# Patient Record
Sex: MALE | Race: BLACK OR AFRICAN AMERICAN | NOT HISPANIC OR LATINO | Employment: UNEMPLOYED | URBAN - METROPOLITAN AREA
[De-identification: names, ages, dates, MRNs, and addresses within clinical notes are randomized per-mention and may not be internally consistent; named-entity substitution may affect disease eponyms.]

---

## 2018-01-12 ENCOUNTER — OFFICE VISIT (OUTPATIENT)
Dept: FAMILY MEDICINE | Facility: CLINIC | Age: 63
End: 2018-01-12
Payer: COMMERCIAL

## 2018-01-12 VITALS
DIASTOLIC BLOOD PRESSURE: 82 MMHG | RESPIRATION RATE: 16 BRPM | HEART RATE: 87 BPM | HEIGHT: 69 IN | SYSTOLIC BLOOD PRESSURE: 123 MMHG | BODY MASS INDEX: 28.73 KG/M2 | WEIGHT: 194 LBS | OXYGEN SATURATION: 97 % | TEMPERATURE: 98 F

## 2018-01-12 DIAGNOSIS — Z11.59 NEED FOR HEPATITIS C SCREENING TEST: ICD-10-CM

## 2018-01-12 DIAGNOSIS — A53.9 SYPHILIS: Primary | ICD-10-CM

## 2018-01-12 DIAGNOSIS — Z23 NEED FOR PROPHYLACTIC VACCINATION AND INOCULATION AGAINST INFLUENZA: ICD-10-CM

## 2018-01-12 DIAGNOSIS — Z23 NEED FOR PROPHYLACTIC VACCINATION WITH TETANUS-DIPHTHERIA (TD): ICD-10-CM

## 2018-01-12 DIAGNOSIS — Z12.11 SCREEN FOR COLON CANCER: ICD-10-CM

## 2018-01-12 DIAGNOSIS — A58: ICD-10-CM

## 2018-01-12 DIAGNOSIS — Z11.3 SCREEN FOR STD (SEXUALLY TRANSMITTED DISEASE): ICD-10-CM

## 2018-01-12 PROCEDURE — 86592 SYPHILIS TEST NON-TREP QUAL: CPT | Performed by: FAMILY MEDICINE

## 2018-01-12 PROCEDURE — 99202 OFFICE O/P NEW SF 15 MIN: CPT | Performed by: FAMILY MEDICINE

## 2018-01-12 PROCEDURE — 36415 COLL VENOUS BLD VENIPUNCTURE: CPT | Performed by: FAMILY MEDICINE

## 2018-01-12 PROCEDURE — 87591 N.GONORRHOEAE DNA AMP PROB: CPT | Performed by: FAMILY MEDICINE

## 2018-01-12 PROCEDURE — 86780 TREPONEMA PALLIDUM: CPT | Performed by: FAMILY MEDICINE

## 2018-01-12 PROCEDURE — 87491 CHLMYD TRACH DNA AMP PROBE: CPT | Performed by: FAMILY MEDICINE

## 2018-01-12 PROCEDURE — 86593 SYPHILIS TEST NON-TREP QUANT: CPT | Performed by: FAMILY MEDICINE

## 2018-01-12 PROCEDURE — 87389 HIV-1 AG W/HIV-1&-2 AB AG IA: CPT | Performed by: FAMILY MEDICINE

## 2018-01-12 RX ORDER — AZITHROMYCIN 500 MG/1
2000 TABLET, FILM COATED ORAL ONCE
Qty: 4 TABLET | Refills: 0 | Status: SHIPPED | OUTPATIENT
Start: 2018-01-12 | End: 2018-01-12

## 2018-01-12 NOTE — MR AVS SNAPSHOT
"              After Visit Summary   1/12/2018    John Oliveira    MRN: [id number]           Patient Information     Date Of Birth          1955        Visit Information        Provider Department      1/12/2018 12:40 PM Duncan Jenkins MD [facility] [place]        Today's Diagnoses     Granuloma inguinale in male    -  1    Screen for colon cancer        Need for hepatitis C screening test        Need for prophylactic vaccination and inoculation against influenza        Need for prophylactic vaccination with tetanus-diphtheria (TD)        Screen for STD (sexually transmitted disease)           Follow-ups after your visit        Follow-up notes from your care team     Return if symptoms worsen or fail to improve.      Who to contact     If you have questions or need follow up information about today's clinic visit or your schedule please contact [facility] [place] directly at [phone].  Normal or non-critical lab and imaging results will be communicated to you by MyChart, letter or phone within 4 business days after the clinic has received the results. If you do not hear from us within 7 days, please contact the clinic through MyChart or phone. If you have a critical or abnormal lab result, we will notify you by phone as soon as possible.  Submit refill requests through [employer] or call your pharmacy and they will forward the refill request to us. Please allow 3 business days for your refill to be completed.          Additional Information About Your Visit        MyChart Information     [employer] lets you send messages to your doctor, view your test results, renew your prescriptions, schedule appointments and more. To sign up, go to [email or web address] . Click on \"Log in\" on the left side of the screen, which will take you to the Welcome page. Then click on \"Sign up Now\" on the right side of the page.     You will be asked to enter the access code listed below, as well as some " "personal information. Please follow the directions to create your username and password.     Your access code is: [email or web address]  Expires: 2018  1:18 PM     Your access code will  in 90 days. If you need help or a new code, please call your [facility] clinic or [phone].        Care EveryWhere ID     This is your Care EveryWhere ID. This could be used by other organizations to access your [facility] medical records  [id number]        Your Vitals Were     Pulse Temperature Respirations Height Pulse Oximetry BMI (Body Mass Index)    87 98  F (36.7  C) (Tympanic) 16 5' 9\" (1.753 m) 97% 28.65 kg/m2       Blood Pressure from Last 3 Encounters:   18 123/82   13 136/90    Weight from Last 3 Encounters:   18 194 lb (88 kg)   13 200 lb (90.7 kg)              We Performed the Following     Anti Treponema     Chlamydia trachomatis PCR     HIV Antigen Antibody Combo     Neisseria gonorrhoeae PCR          Today's Medication Changes          These changes are accurate as of: 18  1:18 PM.  If you have any questions, ask your nurse or doctor.               Start taking these medicines.        Dose/Directions    azithromycin 500 MG tablet   Commonly known as:  ZITHROMAX   Used for:  Granuloma inguinale in male   Started by:  Duncan Jenkins MD        Dose:  2000 mg   Take 4 tablets (2,000 mg) by mouth once for 1 dose   Quantity:  4 tablet   Refills:  0            Where to get your medicines      These medications were sent to [facility] [place] [address] [place] [address]     Phone:  [phone]     azithromycin 500 MG tablet                Primary Care Provider Office Phone # Fax #    Blair Brownlee [phone]       [facility] [address] [place] [address]        Equal Access to Services     KVNG MARTINEZ AH: Hadii aad ku hadasho Soomaali, waaxda luqadaha, qaybta kaalmada " sven boudreauxnita kyreebryson metcalfaan ah. [facility] [phone].    ATENCIÓN: Si habla amy, tiene a salinas disposición servicios gratuitos de asistencia lingüística. Rizwana al [phone].    We comply with applicable federal civil rights laws and [place] laws. We do not discriminate on the basis of race, color, national origin, age, disability, sex, sexual orientation, or gender identity.            Thank you!     Thank you for choosing [facility]  for your care. Our goal is always to provide you with excellent care. Hearing back from our patients is one way we can continue to improve our services. Please take a few minutes to complete the written survey that you may receive in the mail after your visit with us. Thank you!             Your Updated Medication List - Protect others around you: Learn how to safely use, store and throw away your medicines at www.disposemymeds.org.          This list is accurate as of: 1/12/18  1:18 PM.  Always use your most recent med list.                   Brand Name Dispense Instructions for use Diagnosis    azithromycin 500 MG tablet    ZITHROMAX    4 tablet    Take 4 tablets (2,000 mg) by mouth once for 1 dose    Granuloma inguinale in male

## 2018-01-12 NOTE — NURSING NOTE
"Chief Complaint   Patient presents with     Lesion       Initial /82 (Cuff Size: Adult Large)  Pulse 87  Temp 98  F (36.7  C) (Tympanic)  Resp 16  Ht 5' 9\" (1.753 m)  Wt 194 lb (88 kg)  SpO2 97%  BMI 28.65 kg/m2 Estimated body mass index is 28.65 kg/(m^2) as calculated from the following:    Height as of this encounter: 5' 9\" (1.753 m).    Weight as of this encounter: 194 lb (88 kg).  Medication Reconciliation: complete   Tonia Nunes, CMA  "

## 2018-01-12 NOTE — PROGRESS NOTES
"  SUBJECTIVE:   John Oliveira is a 62 year old male who presents to clinic today for the following health issues:      Lesion       Duration: 10 days     Description (location/character/radiation): lesion on penis     Intensity:  moderate    Accompanying signs and symptoms: none     History (similar episodes/previous evaluation): None    Precipitating or alleviating factors: None    Therapies tried and outcome: Alcohol      Problem list and histories reviewed & adjusted, as indicated.  Additional history: as documented    There is no problem list on file for this patient.    Past Surgical History:   Procedure Laterality Date     ORTHOPEDIC SURGERY         Social History   Substance Use Topics     Smoking status: Former Smoker     Quit date: 1/1/2003     Smokeless tobacco: Never Used     Alcohol use Yes     No family history on file.      Current Outpatient Prescriptions   Medication Sig Dispense Refill     azithromycin (ZITHROMAX) 500 MG tablet Take 4 tablets (2,000 mg) by mouth once for 1 dose 4 tablet 0     Allergies   Allergen Reactions     Codeine Sulfate      Penicillins      Recent Labs   Lab Test  03/25/13   0000   CR  1.08   GFRESTIMATED  71   GFRESTBLACK  86   POTASSIUM  3.7      BP Readings from Last 3 Encounters:   01/12/18 123/82   03/24/13 136/90    Wt Readings from Last 3 Encounters:   01/12/18 194 lb (88 kg)   03/24/13 200 lb (90.7 kg)             Reviewed and updated as needed this visit by clinical staff     Reviewed and updated as needed this visit by Provider         ROS:  Constitutional, HEENT, cardiovascular, pulmonary, gi and gu systems are negative, except as otherwise noted.      OBJECTIVE:   /82 (Cuff Size: Adult Large)  Pulse 87  Temp 98  F (36.7  C) (Tympanic)  Resp 16  Ht 5' 9\" (1.753 m)  Wt 194 lb (88 kg)  SpO2 97%  BMI 28.65 kg/m2  Body mass index is 28.65 kg/(m^2).  GENERAL: healthy, alert and no distress  NECK: no adenopathy, no asymmetry, masses, or scars and thyroid " normal to palpation  RESP: lungs clear to auscultation - no rales, rhonchi or wheezes  CV: regular rate and rhythm, normal S1 S2, no S3 or S4, no murmur, click or rub, no peripheral edema and peripheral pulses strong  ABDOMEN: soft, nontender, no hepatosplenomegaly, no masses and bowel sounds normal   (male): testicles normal without atrophy or masses, no hernias and ulcerative lesion on glans penis, not tender, swollen inguinal L/N  MS: no gross musculoskeletal defects noted, no edema        ASSESSMENT/PLAN:   John was seen today for lesion.    Diagnoses and all orders for this visit:    Granuloma inguinale in male  -     azithromycin (ZITHROMAX) 500 MG tablet; Take 4 tablets (2,000 mg) by mouth once for 1 dose    Screen for colon cancer    Need for hepatitis C screening test    Need for prophylactic vaccination and inoculation against influenza    Need for prophylactic vaccination with tetanus-diphtheria (TD)    Screen for STD (sexually transmitted disease)  -     HIV Antigen Antibody Combo  -     Anti Treponema  -     Neisseria gonorrhoeae PCR  -     Chlamydia trachomatis PCR    Other orders  -     Cancel: Hepatitis C Screen Reflex to HCV RNA Quant and Genotype  -     Cancel: Lipid panel reflex to direct LDL Fasting  -     Cancel: GASTROENTEROLOGY ADULT REF PROCEDURE ONLY      Will review other STD results and update pt     Duncan Jenkins MD  [facility] [place]

## 2018-01-13 LAB
RPR SER QL: REACTIVE
RPR SER-TITR: 8 {TITER}
T PALLIDUM IGG+IGM SER QL: POSITIVE

## 2018-01-14 LAB
C TRACH DNA SPEC QL NAA+PROBE: NEGATIVE
N GONORRHOEA DNA SPEC QL NAA+PROBE: NEGATIVE
SPECIMEN SOURCE: NORMAL
SPECIMEN SOURCE: NORMAL

## 2018-01-14 RX ORDER — DOXYCYCLINE 100 MG/1
100 TABLET ORAL 2 TIMES DAILY
Qty: 28 TABLET | Refills: 0 | Status: CANCELLED | OUTPATIENT
Start: 2018-01-14 | End: 2018-01-28

## 2018-01-15 LAB — HIV 1+2 AB+HIV1 P24 AG SERPL QL IA: NONREACTIVE

## 2018-01-16 ENCOUNTER — ALLIED HEALTH/NURSE VISIT (OUTPATIENT)
Dept: NURSING | Facility: CLINIC | Age: 63
End: 2018-01-16
Payer: COMMERCIAL

## 2018-01-16 DIAGNOSIS — A53.9 SYPHILIS: Primary | ICD-10-CM

## 2018-01-16 DIAGNOSIS — Z53.9 ERRONEOUS ENCOUNTER--DISREGARD: Primary | ICD-10-CM

## 2018-01-16 RX ORDER — DOXYCYCLINE 100 MG/1
100 TABLET ORAL 2 TIMES DAILY
Qty: 28 TABLET | Refills: 0 | Status: SHIPPED | OUTPATIENT
Start: 2018-01-16 | End: 2018-01-30

## 2018-01-16 NOTE — PROGRESS NOTES
Pt presents for a nurse only bicillin injection. Pt had questions/concerns and after speaking to Dr Jenkins treatment plan was changed.  No injection given.Miquel JACOBSON CMA

## 2018-01-16 NOTE — MR AVS SNAPSHOT
"              After Visit Summary   2018    John Oliveira    MRN: [id number]           Patient Information     Date Of Birth          1955        Visit Information        Provider Department      2018 3:15 PM ODETTE MOLINA/LPN [facility] [place]        Today's Diagnoses     ERRONEOUS ENCOUNTER--DISREGARD    -  1       Follow-ups after your visit        Who to contact     If you have questions or need follow up information about today's clinic visit or your schedule please contact [facility] [place] directly at [phone].  Normal or non-critical lab and imaging results will be communicated to you by [employer]iC[employer], letter or phone within 4 business days after the clinic has received the results. If you do not hear from us within 7 days, please contact the clinic through [employer] or phone. If you have a critical or abnormal lab result, we will notify you by phone as soon as possible.  Submit refill requests through [employer] or call your pharmacy and they will forward the refill request to us. Please allow 3 business days for your refill to be completed.          Additional Information About Your Visit        MyChart Information     [employer] lets you send messages to your doctor, view your test results, renew your prescriptions, schedule appointments and more. To sign up, go to [email or web address] . Click on \"Log in\" on the left side of the screen, which will take you to the Welcome page. Then click on \"Sign up Now\" on the right side of the page.     You will be asked to enter the access code listed below, as well as some personal information. Please follow the directions to create your username and password.     Your access code is: [email or web address]  Expires: 2018  1:18 PM     Your access code will  in 90 days. If you need help or a new code, please call your [facility] or [phone].        Care EveryWhere ID     This is your Care EveryWhere ID. This could be used by other " organizations to access your [facility] medical records  [id number]         Blood Pressure from Last 3 Encounters:   01/12/18 123/82   03/24/13 136/90    Weight from Last 3 Encounters:   01/12/18 194 lb (88 kg)   03/24/13 200 lb (90.7 kg)              Today, you had the following     No orders found for display         Today's Medication Changes          These changes are accurate as of: 1/16/18  3:47 PM.  If you have any questions, ask your nurse or doctor.               Start taking these medicines.        Dose/Directions    doxycycline Monohydrate 100 MG Tabs   Used for:  Syphilis   Started by:  Duncan Jenkins MD        Dose:  100 mg   Take 100 mg by mouth 2 times daily for 14 days   Quantity:  28 tablet   Refills:  0            Where to get your medicines      These medications were sent to [facility] [place] - [address]  [address], [place] [address]     Phone:  [phone]     doxycycline Monohydrate 100 MG Tabs                Primary Care Provider Office Phone # Fax #    Blair JACOBSON Kem [phone]       [facility] [address] [place] [address]        Equal Access to Services     KVNG MARTINEZ AH: Hadii aad ku hadasho Soomaali, waaxda luqadaha, qaybta kaalmada adeegyada, waxay idiin hayaan yung london. So [facility] [phone].    ATENCIÓN: Si habla español, tiene a salinas disposición servicios gratuitos de asistencia lingüística. Llame al [phone].    We comply with applicable federal civil rights laws and [place] laws. We do not discriminate on the basis of race, color, national origin, age, disability, sex, sexual orientation, or gender identity.            Thank you!     Thank you for choosing [facility] [place]  for your care. Our goal is always to provide you with excellent care. Hearing back from our patients is one way we can continue to improve our services. Please take a few  minutes to complete the written survey that you may receive in the mail after your visit with us. Thank you!             Your Updated Medication List - Protect others around you: Learn how to safely use, store and throw away your medicines at www.disposemymeds.org.          This list is accurate as of: 1/16/18  3:47 PM.  Always use your most recent med list.                   Brand Name Dispense Instructions for use Diagnosis    doxycycline Monohydrate 100 MG Tabs     28 tablet    Take 100 mg by mouth 2 times daily for 14 days    Syphilis

## 2018-11-26 ENCOUNTER — OFFICE VISIT (OUTPATIENT)
Dept: FAMILY MEDICINE | Facility: CLINIC | Age: 63
End: 2018-11-26
Payer: COMMERCIAL

## 2018-11-26 ENCOUNTER — ALLIED HEALTH/NURSE VISIT (OUTPATIENT)
Dept: NURSING | Facility: CLINIC | Age: 63
End: 2018-11-26
Payer: COMMERCIAL

## 2018-11-26 VITALS — SYSTOLIC BLOOD PRESSURE: 112 MMHG | HEART RATE: 72 BPM | DIASTOLIC BLOOD PRESSURE: 72 MMHG

## 2018-11-26 VITALS — HEART RATE: 76 BPM | SYSTOLIC BLOOD PRESSURE: 112 MMHG | DIASTOLIC BLOOD PRESSURE: 72 MMHG

## 2018-11-26 DIAGNOSIS — F43.0 ACUTE REACTION TO STRESS: Primary | ICD-10-CM

## 2018-11-26 DIAGNOSIS — G44.219 EPISODIC TENSION-TYPE HEADACHE, NOT INTRACTABLE: ICD-10-CM

## 2018-11-26 DIAGNOSIS — G44.209 TENSION HEADACHE: Primary | ICD-10-CM

## 2018-11-26 PROCEDURE — 99214 OFFICE O/P EST MOD 30 MIN: CPT | Performed by: FAMILY MEDICINE

## 2018-11-26 PROCEDURE — 99207 ZZC NO CHARGE NURSE ONLY: CPT

## 2018-11-26 NOTE — PROGRESS NOTES
States that the last 2 days having funny sensation coming though his head.  States has had increased stress last 3 days but having headache over 24 hours taking Acetaminophen and that does not help which is not usual for patient.  States that has some lightheadedness last couple days come and go.  State that when he stops it passes quickly.  Feels these may be caused by stress.    Denies: vision changes, ringing in ears, chest pain, palpitations, SOB      Historical BP  BP Readings from Last 3 Encounters:   01/12/18 123/82   03/24/13 136/90       Patient placed on providers schedule for further evaluation  Anne Beaulieu RN - Triage  [facility]

## 2018-11-26 NOTE — MR AVS SNAPSHOT
"              After Visit Summary   11/26/2018    John Oliveira    MRN: [id number]           Patient Information     Date Of Birth          1955        Visit Information        Provider Department      11/26/2018 11:00 AM EC RN [facility]        Today's Diagnoses     Tension headache    -  1       Follow-ups after your visit        Your next 10 appointments already scheduled     Nov 26, 2018 11:20 AM CST   Office Visit with Duncan Jenkins MD   [facility] [place]    [address] [place] [id number] [phone]           Bring a current list of meds and any records pertaining to this visit. For Physicals, please bring immunization records and any forms needing to be filled out. Please arrive 10 minutes early to complete paperwork.              Who to contact     If you have questions or need follow up information about today's clinic visit or your schedule please contact [facility] directly at [phone].  Normal or non-critical lab and imaging results will be communicated to you by BG Medicinehart, letter or phone within 4 business days after the clinic has received the results. If you do not hear from us within 7 days, please contact the clinic through WhoJamt or phone. If you have a critical or abnormal lab result, we will notify you by phone as soon as possible.  Submit refill requests through [employer] or call your pharmacy and they will forward the refill request to us. Please allow 3 business days for your refill to be completed.          Additional Information About Your Visit        BG [employer]harCell[employer] Information     [employer] lets you send messages to your doctor, view your test results, renew your prescriptions, schedule appointments and more. To sign up, go to [email or web address] . Click on \"Log in\" on the left side of the screen, which will take you to the Welcome page. Then click on \"Sign up Now\" on the right " side of the page.     You will be asked to enter the access code listed below, as well as some personal information. Please follow the directions to create your username and password.     Your access code is: [email or web address]  Expires: 2019 11:05 AM     Your access code will  in 90 days. If you need help or a new code, please call your [place] clinic or [phone].        Care EveryWhere ID     This is your Care EveryWhere ID. This could be used by other organizations to access your [place] [facility] medical records  [id number]        Your Vitals Were     Pulse                   76            Blood Pressure from Last 3 Encounters:   18 112/72   18 123/82   13 136/90    Weight from Last 3 Encounters:   18 194 lb (88 kg)   13 200 lb (90.7 kg)              Today, you had the following     No orders found for display       Primary Care Provider Office Phone # Fax #    Blair JACOBSON Kem [phone]       [facility] [address] [place] [address]        Equal Access to Services     [facility]: Hadii aad ku hadasho Soomaali, waaxda luqadaha, qaybta kaalmada adeegyada, sven riverain hayjannien yung kimball . So [facility] [phone].    ATENCIÓN: Si habla español, tiene a salinas disposición servicios gratuitos de asistencia lingüística. Llame al [phone].    We comply with applicable federal civil rights laws and [place] laws. We do not discriminate on the basis of race, color, national origin, age, disability, sex, sexual orientation, or gender identity.            Thank you!     Thank you for choosing [facility] [place]  for your care. Our goal is always to provide you with excellent care. Hearing back from our patients is one way we can continue to improve our services. Please take a few minutes to complete the written survey that you may receive in the mail after your visit with us. Thank you!             Your Updated Medication List -  Protect others around you: Learn how to safely use, store and throw away your medicines at www.disposemymeds.org.          This list is accurate as of 11/26/18 11:05 AM.  Always use your most recent med list.                   Brand Name Dispense Instructions for use Diagnosis    TYLENOL PO      Take 325 mg by mouth as needed for mild pain or fever

## 2018-11-26 NOTE — PROGRESS NOTES
SUBJECTIVE:   John Oliveira is a 63 year old male who presents to clinic today for the following health issues:      States that the last 2 days having funny sensation coming though his head.  States has had increased stress last 3 days but having headache over 24 hours taking Acetaminophen and that does not help which is not usual for patient.  States that has some lightheadedness last couple days come and go.  State that when he stops it passes quickly. Denies it coming on with specific position changes.  Feels these may be caused by stress.    Denies: vision changes, ringing in ears, chest pain, palpitations, SOB      Problem list and histories reviewed & adjusted, as indicated.  Additional history: as documented    There is no problem list on file for this patient.    Past Surgical History:   Procedure Laterality Date     ORTHOPEDIC SURGERY         Social History   Substance Use Topics     Smoking status: Former Smoker     Quit date: 1/1/2003     Smokeless tobacco: Never Used     Alcohol use Yes     No family history on file.      Current Outpatient Prescriptions   Medication Sig Dispense Refill     Acetaminophen (TYLENOL PO) Take 325 mg by mouth as needed for mild pain or fever       Allergies   Allergen Reactions     Codeine Sulfate      Penicillins      Recent Labs   Lab Test  03/25/13   0000   CR  1.08   GFRESTIMATED  71   GFRESTBLACK  86   POTASSIUM  3.7      BP Readings from Last 3 Encounters:   11/26/18 112/72   11/26/18 112/72   01/12/18 123/82    Wt Readings from Last 3 Encounters:   01/12/18 194 lb (88 kg)   03/24/13 200 lb (90.7 kg)                    Reviewed and updated as needed this visit by clinical staff  Allergies  Meds       Reviewed and updated as needed this visit by Provider         ROS:  Constitutional, HEENT, cardiovascular, pulmonary, gi and gu systems are negative, except as otherwise noted.    OBJECTIVE:     /72 (BP Location: Left arm, Patient Position: Chair, Cuff Size:  Adult Regular)  Pulse 72  There is no height or weight on file to calculate BMI.  GENERAL: healthy, alert and no distress  NECK: no adenopathy, no asymmetry, masses, or scars and thyroid normal to palpation  RESP: lungs clear to auscultation - no rales, rhonchi or wheezes  CV: regular rate and rhythm, normal S1 S2, no S3 or S4, no murmur, click or rub, no peripheral edema and peripheral pulses strong  ABDOMEN: soft, nontender, no hepatosplenomegaly, no masses and bowel sounds normal  MS: no gross musculoskeletal defects noted, no edema  Neurologic: normal       ASSESSMENT/PLAN:   John was seen today for headache.    Diagnoses and all orders for this visit:    Acute reaction to stress    Episodic tension-type headache, not intractable      Related with increased level of stress in the family  Is normal hemodynamically, has no neurologic sx  Encouraged him to relaxation and stress management,   Offered to take migraine medicine, pt declined   Will have him to monitor closely, and if not improving, will consider referral CT head for further evaluation     FUTURE APPOINTMENTS:       - Follow-up visit in 2-3 months for CPE and fasting lab     Duncan Jenkins MD  [facility] [place] [facility]

## 2018-11-26 NOTE — MR AVS SNAPSHOT
"              After Visit Summary   2018    John Oliveira    MRN: [id number]           Patient Information     Date Of Birth          1955        Visit Information        Provider Department      2018 11:20 AM Duncan Jenkins MD [facility] [place]        Today's Diagnoses     Acute reaction to stress    -  1    Episodic tension-type headache, not intractable           Follow-ups after your visit        Follow-up notes from your care team     Return in about 3 months (around 2019) for Physical Exam.      Who to contact     If you have questions or need follow up information about today's clinic visit or your schedule please contact [facility] [place] directly at [phone].  Normal or non-critical lab and imaging results will be communicated to you by MyChart, letter or phone within 4 business days after the clinic has received the results. If you do not hear from us within 7 days, please contact the clinic through MyChart or phone. If you have a critical or abnormal lab result, we will notify you by phone as soon as possible.  Submit refill requests through [employer]merge or call your pharmacy and they will forward the refill request to us. Please allow 3 business days for your refill to be completed.          Additional Information About Your Visit        MyChart Information     [employer]merge lets you send messages to your doctor, view your test results, renew your prescriptions, schedule appointments and more. To sign up, go to [email or web address] . Click on \"Log in\" on the left side of the screen, which will take you to the Welcome page. Then click on \"Sign up Now\" on the right side of the page.     You will be asked to enter the access code listed below, as well as some personal information. Please follow the directions to create your username and password.     Your access code is: [email or web address]  Expires: 2019 11:05 AM     Your access code will  in 90 days. If you " need help or a new code, please call your [place] clinic or [phone].        Care EveryWhere ID     This is your Care EveryWhere ID. This could be used by other organizations to access your [place] medical records  [id number]        Your Vitals Were     Pulse                   72            Blood Pressure from Last 3 Encounters:   11/26/18 112/72   11/26/18 112/72   01/12/18 123/82    Weight from Last 3 Encounters:   01/12/18 194 lb (88 kg)   03/24/13 200 lb (90.7 kg)              Today, you had the following     No orders found for display       Primary Care Provider Office Phone # Fax #    Blair Brownlee [phone]       [facility] [address] [place] [address]        Equal Access to Services     [facility]: Hadii aad ku hadasho Soomaali, waaxda luqadaha, qaybta kaalmada adeegyada, waxay nicolein hayaan yung kimball . So [facility] [phone].    ATENCIÓN: Si habla español, tiene a salinas disposición servicios gratuitos de asistencia lingüística. Riz[place]ana al [phone].    We comply with applicable federal civil rights laws and [place] laws. We do not discriminate on the basis of race, color, national origin, age, disability, sex, sexual orientation, or gender identity.            Thank you!     Thank you for choosing [facility] [place]  for your care. Our goal is always to provide you with excellent care. Hearing back from our patients is one way we can continue to improve our services. Please take a few minutes to complete the written survey that you may receive in the mail after your visit with us. Thank you!             Your Updated Medication List - Protect others around you: Learn how to safely use, store and throw away your medicines at www.disposemymeds.org.          This list is accurate as of 11/26/18 11:32 AM.  Always use your most recent med list.                   Brand Name Dispense Instructions for use Diagnosis    TYLENOL PO      Take  325 mg by mouth as needed for mild pain or fever

## 2019-01-31 ENCOUNTER — OFFICE VISIT (OUTPATIENT)
Dept: FAMILY MEDICINE | Facility: CLINIC | Age: 64
End: 2019-01-31

## 2019-01-31 VITALS
RESPIRATION RATE: 14 BRPM | OXYGEN SATURATION: 99 % | BODY MASS INDEX: 28.88 KG/M2 | TEMPERATURE: 97.7 F | WEIGHT: 195 LBS | SYSTOLIC BLOOD PRESSURE: 102 MMHG | HEIGHT: 69 IN | HEART RATE: 82 BPM | DIASTOLIC BLOOD PRESSURE: 70 MMHG

## 2019-01-31 DIAGNOSIS — L98.9 SKIN LESION: Primary | ICD-10-CM

## 2019-01-31 DIAGNOSIS — R21 SKIN RASH: ICD-10-CM

## 2019-01-31 DIAGNOSIS — Z12.11 SCREEN FOR COLON CANCER: ICD-10-CM

## 2019-01-31 DIAGNOSIS — Z86.19 HISTORY OF SYPHILIS: ICD-10-CM

## 2019-01-31 PROCEDURE — 86592 SYPHILIS TEST NON-TREP QUAL: CPT | Performed by: FAMILY MEDICINE

## 2019-01-31 PROCEDURE — 99214 OFFICE O/P EST MOD 30 MIN: CPT | Performed by: FAMILY MEDICINE

## 2019-01-31 PROCEDURE — 86780 TREPONEMA PALLIDUM: CPT | Mod: 59 | Performed by: FAMILY MEDICINE

## 2019-01-31 PROCEDURE — 99000 SPECIMEN HANDLING OFFICE-LAB: CPT | Performed by: FAMILY MEDICINE

## 2019-01-31 PROCEDURE — 86780 TREPONEMA PALLIDUM: CPT | Mod: 90 | Performed by: FAMILY MEDICINE

## 2019-01-31 PROCEDURE — 36415 COLL VENOUS BLD VENIPUNCTURE: CPT | Performed by: FAMILY MEDICINE

## 2019-01-31 ASSESSMENT — MIFFLIN-ST. JEOR: SCORE: 1679.89

## 2019-01-31 NOTE — PROGRESS NOTES
"  SUBJECTIVE:   John Oliveira is a 63 year old male who presents to clinic today for the following health issues:      Rash      Duration: 2-3 months     Description  Location: scalp on and off   Itching: mild    Intensity:  moderate    Accompanying signs and symptoms: None    History (similar episodes/previous evaluation): None    Precipitating or alleviating factors:  New exposures:  None  Recent travel: no      Therapies tried and outcome: none      Problem list and histories reviewed & adjusted, as indicated.  Additional history: as documented    There is no problem list on file for this patient.    Past Surgical History:   Procedure Laterality Date     ORTHOPEDIC SURGERY         Social History     Tobacco Use     Smoking status: Former Smoker     Last attempt to quit: 2003     Years since quittin.0     Smokeless tobacco: Never Used   Substance Use Topics     Alcohol use: Yes     No family history on file.      No current outpatient medications on file.     Allergies   Allergen Reactions     Codeine Sulfate      Penicillins      Recent Labs   Lab Test 13  0000   CR 1.08   GFRESTIMATED 71   GFRESTBLACK 86   POTASSIUM 3.7      BP Readings from Last 3 Encounters:   19 102/70   18 112/72   18 112/72    Wt Readings from Last 3 Encounters:   19 88.5 kg (195 lb)   18 88 kg (194 lb)   13 90.7 kg (200 lb)                    Reviewed and updated as needed this visit by clinical staff       Reviewed and updated as needed this visit by Provider         ROS:  Constitutional, HEENT, cardiovascular, pulmonary, gi and gu systems are negative, except as otherwise noted.    OBJECTIVE:     /70 (Cuff Size: Adult Large)   Pulse 82   Temp 97.7  F (36.5  C) (Tympanic)   Resp 14   Ht 1.753 m (5' 9\")   Wt 88.5 kg (195 lb)   SpO2 99%   BMI 28.80 kg/m    Body mass index is 28.8 kg/m .  GENERAL: healthy, alert and no distress  EYES: Eyes grossly normal to inspection, PERRL " and conjunctivae and sclerae normal  HENT: ear canals and TM's normal, nose and mouth without ulcers or lesions  NECK: no adenopathy, no asymmetry, masses, or scars and thyroid normal to palpation  RESP: lungs clear to auscultation - no rales, rhonchi or wheezes  CV: regular rate and rhythm, normal S1 S2, no S3 or S4, no murmur, click or rub, no peripheral edema and peripheral pulses strong  ABDOMEN: soft, nontender, no hepatosplenomegaly, no masses and bowel sounds normal  MS: no gross musculoskeletal defects noted, no edema  SKIN: no suspicious lesions or rashes  NEURO: Normal strength and tone, mentation intact and speech normal  BACK: no CVA tenderness, no paralumbar tenderness  PSYCH: mentation appears normal, affect normal/bright  LYMPH: no cervical, supraclavicular, axillary, or inguinal adenopathy      ASSESSMENT/PLAN:   John was seen today for derm problem.    Diagnoses and all orders for this visit:    Skin lesion  -     SKIN CARE REFERRAL  -     Treponema Abs w Reflex to RPR and Titer    Screen for colon cancer  -     GASTROENTEROLOGY ADULT REF PROCEDURE ONLY Jose Looney [phone]; No Provider Preference    Skin rash  -     Treponema Abs w Reflex to RPR and Titer    History of syphilis  -     Treponema Abs w Reflex to RPR and Titer    Other orders  -     Cancel: Lipid panel reflex to direct LDL Fasting      Rash with itching on scalp, has elevated edge with geographic shape  He had past h/o syphilis infection, has no other lesion on hands   Will have him to recheck RPR, he wanted to get referred specialist, will have him to see skin care       FUTURE APPOINTMENTS:       - Follow-up visit in 6 months for CPE    Duncan Jenkins MD  [facility] [place]

## 2019-02-02 LAB
RPR SER QL: NONREACTIVE
T PALLIDUM AB SER QL: REACTIVE

## 2019-02-04 ENCOUNTER — TELEPHONE (OUTPATIENT)
Dept: FAMILY MEDICINE | Facility: CLINIC | Age: 64
End: 2019-02-04

## 2019-02-04 DIAGNOSIS — A53.0 POSITIVE RPR TEST: Primary | ICD-10-CM

## 2019-02-04 LAB
RPR SER QL: NONREACTIVE
T PALLIDUM AB SER QL AGGL: REACTIVE

## 2019-02-14 ENCOUNTER — OFFICE VISIT (OUTPATIENT)
Dept: FAMILY MEDICINE | Facility: CLINIC | Age: 64
End: 2019-02-14

## 2019-02-14 VITALS — DIASTOLIC BLOOD PRESSURE: 82 MMHG | SYSTOLIC BLOOD PRESSURE: 124 MMHG

## 2019-02-14 DIAGNOSIS — A53.0 POSITIVE RPR TEST: ICD-10-CM

## 2019-02-14 DIAGNOSIS — D48.5 NEOPLASM OF UNCERTAIN BEHAVIOR OF SKIN: Primary | ICD-10-CM

## 2019-02-14 DIAGNOSIS — T14.8XXA EXCORIATION: ICD-10-CM

## 2019-02-14 PROCEDURE — 86780 TREPONEMA PALLIDUM: CPT | Mod: 90 | Performed by: FAMILY MEDICINE

## 2019-02-14 PROCEDURE — 36415 COLL VENOUS BLD VENIPUNCTURE: CPT | Performed by: FAMILY MEDICINE

## 2019-02-14 PROCEDURE — 99213 OFFICE O/P EST LOW 20 MIN: CPT | Mod: 25 | Performed by: FAMILY MEDICINE

## 2019-02-14 PROCEDURE — 0065U SYFLS TST NONTREPONEMAL ANTB: CPT | Performed by: FAMILY MEDICINE

## 2019-02-14 PROCEDURE — 11104 PUNCH BX SKIN SINGLE LESION: CPT | Performed by: FAMILY MEDICINE

## 2019-02-14 PROCEDURE — 88313 SPECIAL STAINS GROUP 2: CPT | Mod: TC | Performed by: FAMILY MEDICINE

## 2019-02-14 PROCEDURE — 0064U ANTB TP TOTAL&RPR IA QUAL: CPT | Performed by: FAMILY MEDICINE

## 2019-02-14 PROCEDURE — 88305 TISSUE EXAM BY PATHOLOGIST: CPT | Mod: TC | Performed by: FAMILY MEDICINE

## 2019-02-14 PROCEDURE — 99000 SPECIMEN HANDLING OFFICE-LAB: CPT | Performed by: FAMILY MEDICINE

## 2019-02-14 NOTE — PROCEDURES
Name : Punch Biopsy  Indication : Biopsy for pathology to evaluate tissue.  Location(s) : scalp, vertex.  Completed by : Marcie Cleaning MD  Photo Taken : yes.  Anesthesia : Patient was anesthetized by infiltrating the area surrounding the lesion with 1% lidocaine.   epinephrine [id number] : Yes.  Note : Discussed the risk of pain, infection, scarring, hypo- or hyperpigmentation and recurrence or need for re-treatment. The benefits of treatment and alternative treatments were also discussed.    During this procedure, the universal protocol was utilized. The patient's identity was confirmed by no less than two patient identifiers, correct procedure was verified, correct site was verified and marked as applicable and a final pause was completed.    Sterile technique was used throughout the procedure. The skin was cleaned and prepped with surgical cleanser. Once adequate anesthesia was obtained, the lesion was biopsied using a 4 mm punch and appropriate skin traction. The specimen was sent to pathology.    Direct pressure was applied for hemostasis. The skin was closed with simple interrupted sutures of 3-0 Prolene. No bleeding was present upon the completion of the procedure. The wound was coated with antibacterial ointment. A dry sterile dressing was applied.    Total number of stitches in closure of epidermis : 3    Primary provider and referring provider will be informed regarding the wound culture and tissue sample report when it returns.    Suture removal : 7-10 days.

## 2019-02-14 NOTE — PATIENT INSTRUCTIONS
FUTURE APPOINTMENTS  Follow up in 7-10 day(s) for Suture Removal and Tissue Pathology Review with Dr. Cleaning.    SCALP POST-TREATMENT CARE INSTRUCTIONS  Do not go swimming, until the wound is healed.    However, showering is encouraged. The next time you shower, remove the dressing and clean the area with soap and water. Neither soap, water nor shampoo will hurt the surgical area. Dry the area well with a towel or hairdryer and then apply a small amount of Vaseline over the wound. Then, cover again with a new dressing.    Signs of Infection:  Infection can occur in any area where skin has been disrupted.  If you notice persistent redness, swelling, colored drainage, increasing pain, fever or other signs of infection, please call us at: [phone] and ask to have me or my colleague paged. We will call you back to discuss.    Pathology Results:  You will be notified, generally via letter or MyChart, in approximately 10 days. If there is anything we need to discuss or further treatment needed, I will call you to discuss it.

## 2019-02-14 NOTE — PROGRESS NOTES
[facility] - PRIMARY CARE SKIN    CC: Lesion(s)  SUBJECTIVE:   John Oliveira is a(n) 63 year old male who presents to clinic today because of a lesion on the scalp.    He is currently being worked up for syphilis.    Issue One: Lesion on the top of the scalp. It has waxed and waned in intensity.   Onset: 4 months ago.  Enlarging: waxing and waning.  Bleeding: NO.  Itchy or irritating: YES - occasionally itchy. It has also been dry and flaky.  Pain or tenderness: NO.  Changing color: NO.    Issue Two: He has another spot on the right leg.    Personal Medical History  Skin cancer: NO  Eczema Psoriasis Autoimmune   NO NO NO     Family Medical History  Skin cancer: NO  Eczema Psoriasis Autoimmune   NO NO NO     Occupation:  (indoor).    Refer to electronic medical record (EMR) for past medical history and medications.    INTEGUMENTARY/SKIN: POSITIVE for changing lesion  ROS: 14 point review of systems was negative except the symptoms listed above in the HPI.    This document serves as a record of the services and decisions personally performed and made by Devi Cleaning MD and was created by Jose Berrios, a trained medical scribe, based on personal observations and provider statements to the medical scribe.  February 14, 2019 2:16 PM   Jose Berrios    OBJECTIVE:   GENERAL: healthy, alert and no distress.  SKIN: Groves Skin Type - VI.  Scalp, Leg examined. The dermatoscope was used to help evaluate pigmented lesions.  Skin Pertinent Findings:  Scalp, vertex: 2 cm x 1 cm scaly slightly raised lesion. ? Seborrheic keratosis ? Other    Right leg: thickening 6 mm patch secondary to previous excoriation. PIH.    ASSESSMENT:     Encounter Diagnoses   Name Primary?     Neoplasm of uncertain behavior of skin Yes     Excoriation          PLAN:   Patient Instructions   FUTURE APPOINTMENTS  Follow up in 7-10 day(s) for Suture Removal and Tissue Pathology Review with Dr. Cleaning.    SCALP POST-TREATMENT  CARE INSTRUCTIONS  Do not go swimming, until the wound is healed.    However, showering is encouraged. The next time you shower, remove the dressing and clean the area with soap and water. Neither soap, water nor shampoo will hurt the surgical area. Dry the area well with a towel or hairdryer and then apply a small amount of Vaseline over the wound. Then, cover again with a new dressing.    Signs of Infection:  Infection can occur in any area where skin has been disrupted.  If you notice persistent redness, swelling, colored drainage, increasing pain, fever or other signs of infection, please call us at: [phone] and ask to have me or my colleague paged. We will call you back to discuss.    Pathology Results:  You will be notified, generally via letter or MyChart, in approximately 10 days. If there is anything we need to discuss or further treatment needed, I will call you to discuss it.      TT: 20 minutes.  CT: 15 minutes.    The information in this document, created by the medical scribe for me, accurately reflects the services I personally performed and the decisions made by me. I have reviewed and approved this document for accuracy prior to leaving the patient care area.  February 14, 2019 2:16 PM  Devi Cleaning MD  [facility]

## 2019-02-14 NOTE — LETTER
2/14/2019         RE: John Oliveira  [address]  [place] [address]        Dear Colleague,    Thank you for referring your patient, John Oliveira, to the [facility] [place]. Please see a copy of my visit note below.    [facility] - PRIMARY CARE SKIN    CC: Lesion(s)  SUBJECTIVE:   John Oliveira is a(n) 63 year old male who presents to clinic today because of a lesion on the scalp.    He is currently being worked up for syphilis.    Issue One: Lesion on the top of the scalp. It has waxed and waned in intensity.   Onset: 4 months ago.  Enlarging: waxing and waning.  Bleeding: NO.  Itchy or irritating: YES - occasionally itchy. It has also been dry and flaky.  Pain or tenderness: NO.  Changing color: NO.    Issue Two: He has another spot on the right leg.    Personal Medical History  Skin cancer: NO  Eczema Psoriasis Autoimmune   NO NO NO     Family Medical History  Skin cancer: NO  Eczema Psoriasis Autoimmune   NO NO NO     Occupation:  (indoor).    Refer to electronic medical record (EMR) for past medical history and medications.    INTEGUMENTARY/SKIN: POSITIVE for changing lesion  ROS: 14 point review of systems was negative except the symptoms listed above in the HPI.    This document serves as a record of the services and decisions personally performed and made by Devi Cleaning MD and was created by Jose Berrios, a trained medical scribe, based on personal observations and provider statements to the medical scribe.  February 14, 2019 2:16 PM   Jose Berrios    OBJECTIVE:   GENERAL: healthy, alert and no distress.  SKIN: Groves Skin Type - VI.  Scalp, Leg examined. The dermatoscope was used to help evaluate pigmented lesions.  Skin Pertinent Findings:  Scalp, vertex: 2 cm x 1 cm scaly slightly raised lesion. ? Seborrheic keratosis ? Other    Right leg: thickening 6 mm patch secondary to previous excoriation. PIH.    ASSESSMENT:     Encounter Diagnoses    Name Primary?     Neoplasm of uncertain behavior of skin Yes     Excoriation          PLAN:   Patient Instructions   FUTURE APPOINTMENTS  Follow up in 7-10 day(s) for Suture Removal and Tissue Pathology Review with Dr. Cleaning.    SCALP POST-TREATMENT CARE INSTRUCTIONS  Do not go swimming, until the wound is healed.    However, showering is encouraged. The next time you shower, remove the dressing and clean the area with soap and water. Neither soap, water nor shampoo will hurt the surgical area. Dry the area well with a towel or hairdryer and then apply a small amount of Vaseline over the wound. Then, cover again with a new dressing.    Signs of Infection:  Infection can occur in any area where skin has been disrupted.  If you notice persistent redness, swelling, colored drainage, increasing pain, fever or other signs of infection, please call us at: [phone] and ask to have me or my colleague paged. We will call you back to discuss.    Pathology Results:  You will be notified, generally via letter or MyChart, in approximately 10 days. If there is anything we need to discuss or further treatment needed, I will call you to discuss it.      TT: 20 minutes.  CT: 15 minutes.    The information in this document, created by the medical scribe for me, accurately reflects the services I personally performed and the decisions made by me. I have reviewed and approved this document for accuracy prior to leaving the patient care area.  February 14, 2019 2:16 PM  Devi Cleaning MD  [facility] [place]    Again, thank you for allowing me to participate in the care of your patient.        Sincerely,        Devi Cleaning MD

## 2019-02-15 LAB
RPR SER QL: NONREACTIVE
T PALLIDUM AB SER QL: REACTIVE

## 2019-02-17 LAB
RPR SER QL: NONREACTIVE
T PALLIDUM AB SER QL AGGL: REACTIVE

## 2019-02-21 ENCOUNTER — HOSPITAL ENCOUNTER (OUTPATIENT)
Facility: CLINIC | Age: 64
Discharge: HOME | End: 2019-02-21
Attending: SPECIALIST | Admitting: SPECIALIST

## 2019-02-21 VITALS
BODY MASS INDEX: 27.49 KG/M2 | HEART RATE: 75 BPM | SYSTOLIC BLOOD PRESSURE: 116 MMHG | WEIGHT: 192 LBS | OXYGEN SATURATION: 95 % | HEIGHT: 70 IN | RESPIRATION RATE: 13 BRPM | DIASTOLIC BLOOD PRESSURE: 87 MMHG

## 2019-02-21 LAB — COLONOSCOPY: NORMAL

## 2019-02-21 PROCEDURE — G0500 MOD SEDAT ENDO SERVICE >5YRS: HCPCS | Performed by: SPECIALIST

## 2019-02-21 PROCEDURE — 25000128 H RX IP 250 OP 636: Performed by: SPECIALIST

## 2019-02-21 PROCEDURE — G0121 COLON CA SCRN NOT HI RSK IND: HCPCS | Performed by: SPECIALIST

## 2019-02-21 PROCEDURE — 45378 DIAGNOSTIC COLONOSCOPY: CPT | Performed by: SPECIALIST

## 2019-02-21 RX ORDER — LIDOCAINE 40 MG/G
CREAM TOPICAL
Status: DISCONTINUED | OUTPATIENT
Start: 2019-02-21 | End: 2019-02-21 | Stop reason: HOSPADM

## 2019-02-21 RX ORDER — FENTANYL CITRATE 50 UG/ML
INJECTION, SOLUTION INTRAMUSCULAR; INTRAVENOUS PRN
Status: DISCONTINUED | OUTPATIENT
Start: 2019-02-21 | End: 2019-02-21 | Stop reason: HOSPADM

## 2019-02-21 RX ORDER — ONDANSETRON 2 MG/ML
4 INJECTION INTRAMUSCULAR; INTRAVENOUS
Status: DISCONTINUED | OUTPATIENT
Start: 2019-02-21 | End: 2019-02-21 | Stop reason: HOSPADM

## 2019-02-21 ASSESSMENT — MIFFLIN-ST. JEOR: SCORE: 1674.22

## 2019-02-21 NOTE — H&P
"Pre-Endoscopy History and Physical     John Oliveira MRN# [id number]   YOB: 1955 Age: 63 year old     Date of Procedure: 2019  Primary care provider: Blair Brownlee  Type of Endoscopy: Colonoscopy with possible biopsy, possible polypectomy  Reason for Procedure: screening  Type of Anesthesia Anticipated: Conscious Sedation    HPI:    John is a 63 year old male who will be undergoing the above procedure.      A history and physical has been performed. The patient's medications and allergies have been reviewed. The risks and benefits of the procedure and the sedation options and risks were discussed with the patient.  All questions were answered and informed consent was obtained.      He denies a personal or family history of anesthesia complications or bleeding disorders.     There is no problem list on file for this patient.       History reviewed. No pertinent past medical history.     Past Surgical History:   Procedure Laterality Date     ORTHOPEDIC SURGERY Left     foot pins in place        Social History     Tobacco Use     Smoking status: Former Smoker     Last attempt to quit: 2003     Years since quittin.1     Smokeless tobacco: Never Used   Substance Use Topics     Alcohol use: Yes     Comment: rare        History reviewed. No pertinent family history.    Prior to Admission medications    Not on File       Allergies   Allergen Reactions     Codeine Sulfate      Penicillins         REVIEW OF SYSTEMS:   5 point ROS negative except as noted above in HPI, including Gen., Resp., CV, GI &  system review.    PHYSICAL EXAM:   BP (!) 131/107   Resp 16   Ht 1.765 m (5' 9.5\")   Wt 87.1 kg (192 lb)   SpO2 98%   BMI 27.95 kg/m   Estimated body mass index is 27.95 kg/m  as calculated from the following:    Height as of this encounter: 1.765 m (5' 9.5\").    Weight as of this encounter: 87.1 kg (192 lb).   GENERAL APPEARANCE: alert, and oriented  MENTAL STATUS: alert  AIRWAY EXAM: " Mallampatti Class II (visualization of the soft palate, fauces, and uvula)  RESP: lungs clear to auscultation - no rales, rhonchi or wheezes  CV: regular rates and rhythm  DIAGNOSTICS:    Not indicated    IMPRESSION   ASA Class 1 - Healthy patient, no medical problems    PLAN:   Plan for Colonoscopy with possible biopsy, possible polypectomy. We discussed the risks, benefits and alternatives and the patient wished to proceed.    The above has been forwarded to the consulting provider.      Signed Electronically by: Warren Mojica  February 21, 2019

## 2019-02-25 ENCOUNTER — ALLIED HEALTH/NURSE VISIT (OUTPATIENT)
Dept: NURSING | Facility: CLINIC | Age: 64
End: 2019-02-25
Payer: COMMERCIAL

## 2019-02-25 DIAGNOSIS — Z48.02 ENCOUNTER FOR REMOVAL OF SUTURES: Primary | ICD-10-CM

## 2019-02-25 PROCEDURE — 99207 ZZC NO CHARGE LOS: CPT

## 2019-02-25 NOTE — PROGRESS NOTES
John Oliveira presents to the clinic for removal of sutures. The patient has had sutures in place for 11 days. There has been no patient reported signs or symptoms of infection or drainage. 2  sutures are seen and located on the scalp. Tetanus status is up to date. All sutures were easily removed today. Routine wound care discussed by the RN or provider. The patient will follow up as needed.    LOVELY ARORA MA

## 2019-02-28 ENCOUNTER — OFFICE VISIT (OUTPATIENT)
Dept: FAMILY MEDICINE | Facility: CLINIC | Age: 64
End: 2019-02-28
Payer: COMMERCIAL

## 2019-02-28 VITALS
HEIGHT: 70 IN | DIASTOLIC BLOOD PRESSURE: 76 MMHG | SYSTOLIC BLOOD PRESSURE: 124 MMHG | TEMPERATURE: 98 F | OXYGEN SATURATION: 98 % | WEIGHT: 194 LBS | BODY MASS INDEX: 27.77 KG/M2 | RESPIRATION RATE: 12 BRPM | HEART RATE: 86 BPM

## 2019-02-28 DIAGNOSIS — Z86.19 HISTORY OF SYPHILIS: Primary | ICD-10-CM

## 2019-02-28 LAB — COPATH REPORT: NORMAL

## 2019-02-28 PROCEDURE — 99213 OFFICE O/P EST LOW 20 MIN: CPT | Performed by: FAMILY MEDICINE

## 2019-02-28 ASSESSMENT — MIFFLIN-ST. JEOR: SCORE: 1683.29

## 2019-02-28 NOTE — PROGRESS NOTES
"  SUBJECTIVE:   John Oliveira is a 63 year old male who presents to clinic today for the following health issues:      Resutls         Description (location/character/radiation): Pt is here to go over recent lab results.        Problem list and histories reviewed & adjusted, as indicated.  Additional history: as documented    There is no problem list on file for this patient.    Past Surgical History:   Procedure Laterality Date     COLONOSCOPY N/A 2019    Procedure: COLONOSCOPY;  Surgeon: Warren Mojica MD;  Location:  GI     ORTHOPEDIC SURGERY Left     foot pins in place        Social History     Tobacco Use     Smoking status: Former Smoker     Last attempt to quit: 2003     Years since quittin.1     Smokeless tobacco: Never Used   Substance Use Topics     Alcohol use: Yes     Comment: rare      No family history on file.      No current outpatient medications on file.     Allergies   Allergen Reactions     Codeine Sulfate      Penicillins      Recent Labs   Lab Test 13  0000   CR 1.08   GFRESTIMATED 71   GFRESTBLACK 86   POTASSIUM 3.7      BP Readings from Last 3 Encounters:   19 124/76   19 116/87   19 124/82    Wt Readings from Last 3 Encounters:   19 88 kg (194 lb)   19 87.1 kg (192 lb)   19 88.5 kg (195 lb)                    Reviewed and updated as needed this visit by clinical staff       Reviewed and updated as needed this visit by Provider         ROS:  Constitutional, HEENT, cardiovascular, pulmonary, gi and gu systems are negative, except as otherwise noted.    OBJECTIVE:     /76 (Cuff Size: Adult Large)   Pulse 86   Temp 98  F (36.7  C) (Tympanic)   Resp 12   Ht 1.765 m (5' 9.5\")   Wt 88 kg (194 lb)   SpO2 98%   BMI 28.24 kg/m    Body mass index is 28.24 kg/m .  GENERAL: healthy, alert and no distress  NECK: no adenopathy, no asymmetry, masses, or scars and thyroid normal to palpation  RESP: lungs clear to auscultation - no " rales, rhonchi or wheezes  CV: regular rate and rhythm, normal S1 S2, no S3 or S4, no murmur, click or rub, no peripheral edema and peripheral pulses strong  ABDOMEN: soft, nontender, no hepatosplenomegaly, no masses and bowel sounds normal  MS: no gross musculoskeletal defects noted, no edema        ASSESSMENT/PLAN:   John was seen today for results.    Diagnoses and all orders for this visit:    History of syphilis      His new set of treponemal test were reactive, but fortunately non treponemal test were all normal. He has no particular syphilis sx, his scalp biopsy results is not related with tertiary syphilis   Will have him to keep watching sx, and f/u with skin care for scalp lesion       Duncan Jenkins MD  [facility] [place]

## 2019-03-06 ENCOUNTER — TELEPHONE (OUTPATIENT)
Dept: FAMILY MEDICINE | Facility: CLINIC | Age: 64
End: 2019-03-06

## 2019-03-06 DIAGNOSIS — L40.9 SCALP PSORIASIS: Primary | ICD-10-CM

## 2019-03-06 RX ORDER — FLUOCINONIDE TOPICAL SOLUTION USP, 0.05% 0.5 MG/ML
SOLUTION TOPICAL
Qty: 60 ML | Refills: 0 | Status: SHIPPED | OUTPATIENT
Start: 2019-03-06 | End: 2019-03-22

## 2019-03-06 NOTE — TELEPHONE ENCOUNTER
Notes recorded by Devi Cleaning MD on 3/6/2019 at 11:34 AM CST  Encounter : voicemail    Recommendations :      Explained the lesion is not caused by syhpilis     Consistent with sebopsoraisis     Treatment :  Fluocinonide 0.05% solution 3-5 drops to AA bid for 10-14 days      Explained that this condition is controlled not cured      If he would have more area of involvement then recheck to change treatment plan.     Left voicemail

## 2019-03-22 ENCOUNTER — OFFICE VISIT (OUTPATIENT)
Dept: FAMILY MEDICINE | Facility: CLINIC | Age: 64
End: 2019-03-22
Payer: COMMERCIAL

## 2019-03-22 VITALS
SYSTOLIC BLOOD PRESSURE: 130 MMHG | WEIGHT: 195 LBS | HEART RATE: 89 BPM | OXYGEN SATURATION: 99 % | DIASTOLIC BLOOD PRESSURE: 85 MMHG | TEMPERATURE: 97 F | BODY MASS INDEX: 28.38 KG/M2

## 2019-03-22 DIAGNOSIS — S60.519D: Primary | ICD-10-CM

## 2019-03-22 PROCEDURE — 99213 OFFICE O/P EST LOW 20 MIN: CPT | Performed by: FAMILY MEDICINE

## 2019-03-22 NOTE — PROGRESS NOTES
SUBJECTIVE:   John Oliveira is a 63 year old male who presents to clinic today for the following health issues:      Concern - cut on right hand   Onset: 3 weeks ago     Description:   ot cut hand on a tool at work 3 weeks ago     Intensity: mild    Progression of Symptoms:  same    Accompanying Signs & Symptoms:  Pain to the touch     Previous history of similar problem:       Precipitating factors:   Worsened by:     Alleviating factors:  Improved by:     Therapies Tried and outcome: none         Problem list and histories reviewed & adjusted, as indicated.      There is no problem list on file for this patient.    Past Surgical History:   Procedure Laterality Date     COLONOSCOPY N/A 2019    Procedure: COLONOSCOPY;  Surgeon: Warren Mojica MD;  Location:  GI     ORTHOPEDIC SURGERY Left     foot pins in place        Social History     Tobacco Use     Smoking status: Former Smoker     Last attempt to quit: 2003     Years since quittin.2     Smokeless tobacco: Never Used   Substance Use Topics     Alcohol use: Yes     Comment: rare      No family history on file.      No current outpatient medications on file.       Reviewed and updated as needed this visit by clinical staff       Reviewed and updated as needed this visit by Provider         ROS:  CONSTITUTIONAL: NEGATIVE for fever, chills, change in weight  ROS otherwise negative    OBJECTIVE:                                                    /85   Pulse 89   Temp 97  F (36.1  C) (Tympanic)   Wt 88.5 kg (195 lb)   SpO2 99%   BMI 28.38 kg/m    Body mass index is 28.38 kg/m .   GENERAL: healthy, alert, well nourished, well hydrated, no distress  Small abrasion with well-healed scar without any surrounding redness on the in the knuckle of the hand.         ASSESSMENT/PLAN:                                                        ICD-10-CM    1. Abrasion of hand, unspecified laterality, subsequent encounter S60.519D        Patient is  reassured ,I do not appreciate any signs of infection.  It is healing that is why it is little rough on the top.  Avoid picking on that.  Soak it in the water.  I would not suggest any further evaluation or antibiotic unless there is any discharge or redness.    Gume Padilla MD  [facility]

## 2019-07-17 ENCOUNTER — OFFICE VISIT (OUTPATIENT)
Dept: FAMILY MEDICINE | Facility: CLINIC | Age: 64
End: 2019-07-17
Payer: MEDICAID

## 2019-07-17 VITALS
OXYGEN SATURATION: 98 % | HEART RATE: 102 BPM | DIASTOLIC BLOOD PRESSURE: 76 MMHG | SYSTOLIC BLOOD PRESSURE: 128 MMHG | RESPIRATION RATE: 14 BRPM | BODY MASS INDEX: 27.06 KG/M2 | WEIGHT: 189 LBS | TEMPERATURE: 98 F | HEIGHT: 70 IN

## 2019-07-17 DIAGNOSIS — R21 RASH: ICD-10-CM
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
## 2019-07-17 DIAGNOSIS — Z11.3 SCREEN FOR STD (SEXUALLY TRANSMITTED DISEASE): ICD-10-CM

## 2019-07-17 DIAGNOSIS — Z13.220 SCREENING FOR HYPERLIPIDEMIA: Primary | ICD-10-CM

## 2019-07-17 PROCEDURE — 36415 COLL VENOUS BLD VENIPUNCTURE: CPT | Performed by: FAMILY MEDICINE

## 2019-07-17 PROCEDURE — 86780 TREPONEMA PALLIDUM: CPT | Performed by: FAMILY MEDICINE

## 2019-07-17 PROCEDURE — 87591 N.GONORRHOEAE DNA AMP PROB: CPT | Mod: 59 | Performed by: FAMILY MEDICINE

## 2019-07-17 PROCEDURE — 99000 SPECIMEN HANDLING OFFICE-LAB: CPT | Performed by: FAMILY MEDICINE

## 2019-07-17 PROCEDURE — 87491 CHLMYD TRACH DNA AMP PROBE: CPT | Performed by: FAMILY MEDICINE

## 2019-07-17 PROCEDURE — 86592 SYPHILIS TEST NON-TREP QUAL: CPT | Performed by: FAMILY MEDICINE

## 2019-07-17 PROCEDURE — 99214 OFFICE O/P EST MOD 30 MIN: CPT | Performed by: FAMILY MEDICINE

## 2019-07-17 PROCEDURE — 87389 HIV-1 AG W/HIV-1&-2 AB AG IA: CPT | Performed by: FAMILY MEDICINE

## 2019-07-17 PROCEDURE — 86780 TREPONEMA PALLIDUM: CPT | Mod: 59 | Performed by: FAMILY MEDICINE

## 2019-07-17 ASSESSMENT — MIFFLIN-ST. JEOR: SCORE: 1655.61

## 2019-07-17 NOTE — PROGRESS NOTES
"Subjective     John Oliveira is a 64 year old male who presents to clinic today for the following health issues:    HPI   Derm Problem       Duration: 2 days     Description  Location: right upper leg and right lower arm   Itching: moderate on and off     Intensity:  moderate    Accompanying signs and symptoms: None    History (similar episodes/previous evaluation): None    Precipitating or alleviating factors:  New exposures:  None  Recent travel: no      Therapies tried and outcome: none      There is no problem list on file for this patient.    Past Surgical History:   Procedure Laterality Date     COLONOSCOPY N/A 2019    Procedure: COLONOSCOPY;  Surgeon: Warren Mojica MD;  Location:  GI     ORTHOPEDIC SURGERY Left     foot pins in place        Social History     Tobacco Use     Smoking status: Former Smoker     Last attempt to quit: 2003     Years since quittin.5     Smokeless tobacco: Never Used   Substance Use Topics     Alcohol use: Yes     Comment: rare      No family history on file.      No current outpatient medications on file.     Allergies   Allergen Reactions     Codeine Sulfate      Penicillins      Recent Labs   Lab Test 13  0000   CR 1.08   GFRESTIMATED 71   GFRESTBLACK 86   POTASSIUM 3.7      BP Readings from Last 3 Encounters:   19 128/76   19 130/85   19 124/76    Wt Readings from Last 3 Encounters:   19 85.7 kg (189 lb)   19 88.5 kg (195 lb)   19 88 kg (194 lb)              Reviewed and updated as needed this visit by Provider         Review of Systems   ROS COMP: Constitutional, HEENT, cardiovascular, pulmonary, gi and gu systems are negative, except as otherwise noted.      Objective    /76 (Cuff Size: Adult Large)   Pulse 102   Temp 98  F (36.7  C) (Tympanic)   Resp 14   Ht 1.765 m (5' 9.5\")   Wt 85.7 kg (189 lb)   SpO2 98%   BMI 27.51 kg/m    Body mass index is 27.51 kg/m .  Physical Exam   GENERAL: healthy, alert " "and no distress  EYES: Eyes grossly normal to inspection, PERRL and conjunctivae and sclerae normal  HENT: ear canals and TM's normal, nose and mouth without ulcers or lesions  NECK: no adenopathy, no asymmetry, masses, or scars and thyroid normal to palpation  RESP: lungs clear to auscultation - no rales, rhonchi or wheezes  CV: regular rate and rhythm, normal S1 S2, no S3 or S4, no murmur, click or rub, no peripheral edema and peripheral pulses strong  ABDOMEN: soft, nontender, no hepatosplenomegaly, no masses and bowel sounds normal   (male): normal male genitalia without lesions or urethral discharge, no hernia  MS: no gross musculoskeletal defects noted, no edema  SKIN: no suspicious lesions or rashes  NEURO: Normal strength and tone, mentation intact and speech normal  BACK: no CVA tenderness, no paralumbar tenderness  PSYCH: mentation appears normal, affect normal/bright  LYMPH: no cervical, supraclavicular, axillary, or inguinal adenopathy            Assessment & Plan     1. Screening for hyperlipidemia      2. Bug bite, initial encounter  On right leg, has no sign of infection   Will keep monitoring  - HIV Antigen Antibody Combo  - Treponema Abs w Reflex to RPR and Titer  - Neisseria gonorrhoeae PCR  - Chlamydia trachomatis PCR    3. Rash    - HIV Antigen Antibody Combo  - Treponema Abs w Reflex to RPR and Titer  - Neisseria gonorrhoeae PCR  - Chlamydia trachomatis PCR    4. Screen for STD (sexually transmitted disease)  Has been doing sexual activity without barrier method, has rash on leg, has past h/o syphilis, will have him to check STD screening test  - HIV Antigen Antibody Combo  - Treponema Abs w Reflex to RPR and Titer  - Neisseria gonorrhoeae PCR  - Chlamydia trachomatis PCR     BMI:   Estimated body mass index is 27.51 kg/m  as calculated from the following:    Height as of this encounter: 1.765 m (5' 9.5\").    Weight as of this encounter: 85.7 kg (189 lb).           FUTURE APPOINTMENTS:       - " Follow-up visit in 6 months for CPE    No follow-ups on file.    Duncan Jenkins MD  [facility]

## 2019-07-18 LAB
C TRACH DNA SPEC QL NAA+PROBE: NEGATIVE
HIV 1+2 AB+HIV1 P24 AG SERPL QL IA: NONREACTIVE
N GONORRHOEA DNA SPEC QL NAA+PROBE: NEGATIVE
RPR SER QL: NONREACTIVE
SPECIMEN SOURCE: NORMAL
SPECIMEN SOURCE: NORMAL
T PALLIDUM AB SER QL: REACTIVE

## 2019-07-22 LAB
RPR SER QL: NONREACTIVE
T PALLIDUM AB SER QL AGGL: REACTIVE

## 2019-11-04 ENCOUNTER — HOSPITAL ENCOUNTER (EMERGENCY)
Facility: CLINIC | Age: 64
Discharge: HOME | End: 2019-11-04
Admitting: PHYSICIAN ASSISTANT
Payer: COMMERCIAL

## 2019-11-04 VITALS
HEART RATE: 80 BPM | RESPIRATION RATE: 18 BRPM | DIASTOLIC BLOOD PRESSURE: 82 MMHG | OXYGEN SATURATION: 98 % | SYSTOLIC BLOOD PRESSURE: 123 MMHG | TEMPERATURE: 97.7 F

## 2019-11-04 DIAGNOSIS — K61.0 PERIANAL ABSCESS: ICD-10-CM

## 2019-11-04 PROCEDURE — 87186 SC STD MICRODIL/AGAR DIL: CPT | Performed by: PHYSICIAN ASSISTANT

## 2019-11-04 PROCEDURE — 99283 EMERGENCY DEPT VISIT LOW MDM: CPT | Mod: 25

## 2019-11-04 PROCEDURE — 46050 I&D PERIANAL ABSCESS SUPFC: CPT

## 2019-11-04 PROCEDURE — 87077 CULTURE AEROBIC IDENTIFY: CPT | Performed by: PHYSICIAN ASSISTANT

## 2019-11-04 PROCEDURE — 87070 CULTURE OTHR SPECIMN AEROBIC: CPT | Performed by: PHYSICIAN ASSISTANT

## 2019-11-04 RX ORDER — CIPROFLOXACIN 500 MG/1
500 TABLET, FILM COATED ORAL 2 TIMES DAILY
Qty: 14 TABLET | Refills: 0 | Status: SHIPPED | OUTPATIENT
Start: 2019-11-04 | End: 2020-01-14

## 2019-11-04 RX ORDER — HYDROCODONE BITARTRATE AND ACETAMINOPHEN 5; 325 MG/1; MG/1
1 TABLET ORAL EVERY 6 HOURS PRN
Qty: 8 TABLET | Refills: 0 | Status: SHIPPED | OUTPATIENT
Start: 2019-11-04 | End: 2020-01-14

## 2019-11-04 RX ORDER — METRONIDAZOLE 500 MG/1
500 TABLET ORAL 3 TIMES DAILY
Qty: 21 TABLET | Refills: 0 | Status: SHIPPED | OUTPATIENT
Start: 2019-11-04 | End: 2020-01-14

## 2019-11-04 ASSESSMENT — ENCOUNTER SYMPTOMS: WOUND: 1

## 2019-11-04 NOTE — ED PROVIDER NOTES
History   Chief Complaint:  Wound Check    HPI   John Oliveira is an otherwise healthy 64 year old male who presents for a wound check. The patient reports that two days ago he developed three boils on his buttocks which have become increasingly painful. The pain has progressed to the point where he is mostly unable to sit down or move effectively. The patient endorses having similar boils in the past for which he was placed on antibiotics.  He denies any rectal pain or pain with BMs.  He denies any abdominal pain, fever, or chills and is not diabetic.    Allergies:  Codeine sulfate  Penicillins    Medications:    The patient is currently on no regular medications.    Past Medical History:    The patient does not have any past pertinent medical history.    Past Surgical History:    Left foot pins in place, left    Family History:    History reviewed. No pertinent family history.     Social History:  Smoking status: Former smoker  Alcohol use: Yes  Drug use: No  Marital Status:  Single [1]     Review of Systems   Skin: Positive for wound (painful boils on buttocks x3).   All other systems reviewed and are negative.    Physical Exam     Patient Vitals for the past 24 hrs:   BP Temp Temp src Pulse Heart Rate Resp SpO2   11/04/19 1649 123/82 97.7  F (36.5  C) Oral 80 80 18 98 %     Physical Exam  General: Alert and cooperative with exam. Resting comfortably on gurney  Head:  Scalp is NC/AT  Eyes:  No scleral icterus, PERRL  ENT:  The external nose and ears are normal.   Neck:  Normal range of motion without rigidity.  CV:  Regular rate and rhythm    No pathologic murmur, rubs, or gallops.  Resp:  Breath sounds are clear bilaterally.  No crackles, wheezes, rhonchi.    Non-labored, no retractions or accessory muscle use  GI:  Abdomen is soft, no distension, no tenderness, no masses. No peritoneal signs.  Bowel sounds present in all quadrants  MS:  No lower extremity edema or asymmetric calf swelling.  Skin:  Warm and  dry.  There is a approximately 3 x 3 cm area of redness, fluctuance, and tenderness over the left buttocks.  There is a smaller approximately 1 cm area of swelling inferior to the left gluteal fold  Neuro: Oriented. No gross motor deficits.  Psych: Awake. Alert. Normal affect. Appropriate interactions.      Emergency Department Course   Laboratory:  Wound Culture Aerobic Bacterial: pending    Procedures:      Procedure: Incision and Drainage   LOCATIONS:  Left buttox     ANESTHESIA:  Local field block using Lidocaine 1% with epinephrine, total of 13.0 mLs     PREPARATION:  Cleansed with Betadine     PROCEDURE:  Area was incised with # 11 Blade ([place]) with a Single Straight 2.0 cm incision.  Wound treatment included Deloculation and Purulent Drainage.  Packing consisted of No Packing.  Appropriate dressing was applied to cover the area.    Patient Status:        Patient tolerated the procedure well. There were no complications.    Emergency Department Course:  Past medical records, nursing notes, and vitals reviewed.   1705: I performed an exam of the patient and obtained history, as documented above.    1720: I rechecked the patient. I performed the I & D procedure as noted above. Discussed the findings with the patient.    Findings and plan explained to the patient. Patient discharged home with instructions regarding supportive care, medications, and reasons to return. The importance of close follow-up was reviewed.     Impression & Plan    Medical Decision Makin-year-old male presents with swelling and abscess in the perianal area.  Patient history and records reviewed.  On examination, the patient has a larger left-sided perianal abscess with smaller area of swelling versus phlegmon in the lower perianal area which does not require drainage at this time.  This was incised and drained as above and culture was sent which is pending.  There is no convincing evidence of deeper perirectal abscess, and  there are no signs which point to sepsis and patient is afebrile not tachycardic and well-appearing.  We will place him on a course of Cipro and Flagyl given penicillin allergy and also given a short course of pain medicine for home.  He will follow-up with colon and rectal surgery for wound recheck in 48 hours.  He will return to the emergency department immediately if new or worsening symptoms including spreading swelling or redness, fever, generalized abdominal pain etc.    Diagnosis:    ICD-10-CM   1. Perianal abscess K61.0       Disposition:  Discharged to home    Discharge Medications:  New Prescriptions    CIPROFLOXACIN (CIPRO) 500 MG TABLET    Take 1 tablet (500 mg) by mouth 2 times daily for 7 days    HYDROCODONE-ACETAMINOPHEN (NORCO) 5-325 MG TABLET    Take 1 tablet by mouth every 6 hours as needed for severe pain    METRONIDAZOLE (FLAGYL) 500 MG TABLET    Take 1 tablet (500 mg) by mouth 3 times daily for 7 days       Gabe Urrutia  11/4/2019    EMERGENCY DEPARTMENT  I, Gabe Renan, am serving as a scribe at 5:05 PM on 11/4/2019 to document services personally performed by Bladimir Wilhelm PA-C based on my observations and the provider's statements to me.        Bladimir Wilhelm PA-C  11/04/19 2042

## 2019-11-04 NOTE — ED TRIAGE NOTES
"Pt here with c/o \"boil on buttocks\" states it popped up a couple days ago, hx of same recently and was placed on abx for them   "

## 2019-11-04 NOTE — ED AVS SNAPSHOT
Emergency Department  [address] [place] [address]  Phone:  [phone]  Fax:  [phone]                                    John Oliveira   MRN: [id number]    Department:   Emergency Department   Date of Visit:  11/4/2019           After Visit Summary Signature Page    I have received my discharge instructions, and my questions have been answered. I have discussed any challenges I see with this plan with the nurse or doctor.    ..........................................................................................................................................  Patient/Patient Representative Signature      ..........................................................................................................................................  Patient Representative Print Name and Relationship to Patient    ..................................................               ................................................  Date                                   Time    ..........................................................................................................................................  Reviewed by Signature/Title    ...................................................              ..............................................  Date                                               Time          22EPIC Rev 08/18

## 2019-11-06 LAB
BACTERIA SPEC CULT: ABNORMAL
Lab: ABNORMAL
SPECIMEN SOURCE: ABNORMAL

## 2019-11-07 ENCOUNTER — TELEPHONE (OUTPATIENT)
Dept: EMERGENCY MEDICINE | Facility: CLINIC | Age: 64
End: 2019-11-07

## 2019-11-07 NOTE — TELEPHONE ENCOUNTER
[facility] Emergency Department Lab result notification:    Reason for call  Inform and access patient of lab result and wound  Lab Result  Final Wound culture (Buttock) report on 11/7/19  Emergency Dept discharge antibiotic prescribed: Ciprofloxacin (Cipro) 500 mg tablet, 1 tablet (500 mg) by mouth 2 times daily for 7 days.  #1. Bacteria, Moderate growth Methicillin resistant Staphylococcus aureus (MRSA) , which is [NOT TESTED] to antibiotic   Incision and Drainage performed in [place] ED [Yes / No]: Yes  Patient to be notified of result, symptoms assessed and advised per [place] ED lab result protocol.  ED visit Date: 11/4/19  Symptoms reported at ED visit Wound Check  HPI   John Oliveira is an otherwise healthy 64 year old male who presents for a wound check. The patient reports that two days ago he developed three boils on his buttocks which have become increasingly painful. The pain has progressed to the point where he is mostly unable to sit down or move effectively. The patient endorses having similar boils in the past for which he was placed on antibiotics.  He denies any rectal pain or pain with BMs.  He denies any abdominal pain, fever, or chills and is not diabetic.   Miscellaneous information 64-year-old male presents with swelling and abscess in the perianal area.  Patient history and records reviewed.  On examination, the patient has a larger left-sided perianal abscess with smaller area of swelling versus phlegmon in the lower perianal area which does not require drainage at this time.  This was incised and drained as above and culture was sent which is pending.  There is no convincing evidence of deeper perirectal abscess, and there are no signs which point to sepsis and patient is afebrile not tachycardic and well-appearing.  We will place him on a course of Cipro and Flagyl given penicillin allergy and also given a short course of pain medicine for home.  He will follow-up with  colon and rectal surgery for wound recheck in 48 hours.  He will return to the emergency department immediately if new or worsening symptoms including spreading swelling or redness, fever, generalized abdominal pain etc.  Diagnosis:  1. Perianal abscess K61.0        Current symptoms  5:55 pm Pt was seen today by Winifred Clayton PA-C at [facility]. He says he is feeling ok. Two of the areas are much better. They needed to reopen and drain one area some more today. He is scheduled for a CT scan tomorrow morning. He would like the provider he saw to make the call on treatment changes if needed vs the ED provider.   Recommendations/Instructions  I advised he call the colon rectal provider he saw tomorrow morning for advise on antibiotic changes as needed. To let the CT scan assistants know about the MRSA dx. Discussed hygiene and MRSA information from Epic references in protocol. If ever gets another infection anywhere on the body to let provider know about his MRSA history. Knows to be seen in ED immediately if get worse in anyway.     Contact your PCP clinic or return to the Emergency department if your:    Symptoms return.    Symptoms do not improved after 3 days on antibiotic.    Symptoms do not resolve after completing antibiotic.    Symptoms worsen or other concerning symptom's.    Latia Dickey RN  [employer]   Lung Nodule and ED Lab Result F/U RN  Epic pool (ED late result f/u RN): [phone] [id number]  # [phone]

## 2019-11-08 ENCOUNTER — TRANSFERRED RECORDS (OUTPATIENT)
Dept: HEALTH INFORMATION MANAGEMENT | Facility: CLINIC | Age: 64
End: 2019-11-08

## 2019-11-09 ENCOUNTER — HOSPITAL ENCOUNTER (EMERGENCY)
Facility: CLINIC | Age: 64
Discharge: HOME | End: 2019-11-09
Attending: EMERGENCY MEDICINE | Admitting: EMERGENCY MEDICINE
Payer: COMMERCIAL

## 2019-11-09 VITALS
DIASTOLIC BLOOD PRESSURE: 91 MMHG | BODY MASS INDEX: 27.49 KG/M2 | HEIGHT: 70 IN | SYSTOLIC BLOOD PRESSURE: 145 MMHG | HEART RATE: 105 BPM | TEMPERATURE: 97 F | OXYGEN SATURATION: 99 % | WEIGHT: 192 LBS | RESPIRATION RATE: 16 BRPM

## 2019-11-09 DIAGNOSIS — A49.02 MRSA INFECTION: ICD-10-CM

## 2019-11-09 PROCEDURE — 76857 US EXAM PELVIC LIMITED: CPT

## 2019-11-09 PROCEDURE — 99284 EMERGENCY DEPT VISIT MOD MDM: CPT | Mod: 25

## 2019-11-09 RX ORDER — DOXYCYCLINE 100 MG/1
100 CAPSULE ORAL 2 TIMES DAILY
Qty: 20 CAPSULE | Refills: 0 | Status: SHIPPED | OUTPATIENT
Start: 2019-11-09 | End: 2020-01-14

## 2019-11-09 ASSESSMENT — ENCOUNTER SYMPTOMS
FEVER: 0
ROS SKIN COMMENTS: ABSCESS

## 2019-11-09 ASSESSMENT — MIFFLIN-ST. JEOR: SCORE: 1677.16

## 2019-11-09 NOTE — ED TRIAGE NOTES
Had perianal abscess drained here Monday, had re eval with primary and sent for CT. CT showed MRSA in wound. Patient feels that wound is getting bigger again.

## 2019-11-09 NOTE — ED AVS SNAPSHOT
Emergency Department  [address] [place] [address]  Phone:  [phone]  Fax:  [phone]                                    John Oliveira   MRN: [id number]    Department:   Emergency Department   Date of Visit:  11/9/2019           After Visit Summary Signature Page    I have received my discharge instructions, and my questions have been answered. I have discussed any challenges I see with this plan with the nurse or doctor.    ..........................................................................................................................................  Patient/Patient Representative Signature      ..........................................................................................................................................  Patient Representative Print Name and Relationship to Patient    ..................................................               ................................................  Date                                   Time    ..........................................................................................................................................  Reviewed by Signature/Title    ...................................................              ..............................................  Date                                               Time          22EPIC Rev 08/18

## 2019-11-09 NOTE — LETTER
November 9, 2019      To Whom It May Concern:      John Oliveira was seen in our Emergency Department today, 11/09/19.  He may return to work on Tuesday, November 12, 2019.       Sincerely,        Dr. Sydney Piper MD

## 2019-11-09 NOTE — ED NOTES
Patient has a wound on his buttock. Patient states it was drained and cultured earlier this week while seen in the ED. Culture result shows wound tested positive for MRSA. Patient states was started on cipro and flagyl. Patient states feels like wound is bigger.

## 2019-11-09 NOTE — ED PROVIDER NOTES
"  History     Chief Complaint:  Wound Check    HPI   John Oliveira is a 64 year old male who presents with a wound check. The patient came to the emergency room on 11/4 and had a buttock abscess drained. . The patient was reevaluated by colorectal NP  on 11/7 where they attempted to drain his abscess more. He had a CT done at [facility] yesterday as well. The patient comes to the ED as he feels his wound is getting larger again. He denies fevers, or a history of previous abscesses.  He notes he has been taking Cipro and Flagyl and feels as if this medication is not effective. He did have a culture done and it grew out MRSA. He denies any history of previous infections with MRSA.    11/4/19 Wound Culture: Moderate growth Methicillin Resistant Staphylococcus Aureus    11/8/19 CT Abdomen Pelvis w Contrast  1. No evidence of residual abscess in the perianal region. Mild stranding in the posterolateral aspect of the anal canal probably reflects area of prior drainage procedure.  2. Abdomen and pelvis are otherwise unremarkable. No enlarged lymph nodes.   As read by Radiology.    Allergies:  Codeine Sulfate  Penicillins    Medications:    Cipro  Flagyl    Past Medical History:    The patient does not have any past pertinent medical history.    Past Surgical History:    Foot pins placed  (L)    Family History:    History reviewed. No pertinent family history.     Social History:  Smoking status: Former  Alcohol use: Yes, rare  Drug use: No  PCP: Blair Brownlee at [facility]  Colorectal surgeon: PA  Lives with daughter  Works as   Marital Status:  Single [1]    Review of Systems   Constitutional: Negative for fever.   Skin:        abscess   All other systems reviewed and are negative.        Physical Exam     Patient Vitals for the past 24 hrs:   BP Temp Temp src Pulse Resp SpO2 Height Weight   11/09/19 1735 (!) 145/91 97  F (36.1  C) Temporal 105 16 99 % 1.778 m (5' 10\") 87.1 kg (192 lb) "     Physical Exam  General: Resting comfortably on the gurney  Head:  The scalp, face, and head appear normal  Neck:  Normal range of motion  Neuro:  Speech is normal and fluent. Moves all 4 extremities.   Psych:  Awake. Alert.  Normal affect.      Appropriate interactions  Skin: See below. Has external hemorrhoids but no infection around rectum. Superficial skin breakdown in gluteal cleft. No obvious fluctuance to the lesions on the buttocks.         Emergency Department Course   Procedures:    Limited Bedside Screening Ultrasound     PERFORMED BY: Dr. Piper  BODY AREA IMAGED: Buttock  INDICATIONS: Abscess in past  FINDINGS: No abscess seen.      Emergency Department Course:  Past medical records, nursing notes, and vitals reviewed.  1744: I performed an exam of the patient and obtained history, as documented above.    1536: I rechecked the patient. Explained findings to the patient. I performed a bedside ultrasound. See note above.    Findings and plan explained to the Patient. Patient discharged home with instructions regarding supportive care, medications, and reasons to return. The importance of close follow-up was reviewed.     Impression & Plan    Medical Decision Making:  The patient was seen 5 days ago here for abscess on the buttock which was drained.  Culture grew MRSA.  He had been discharged with Cipro and Flagyl.  He followed up with colorectal who ordered a CT scan which not show any abscess.  He is here because he is not feel he is getting any better.  He does have multiple small areas of infection; see picture above.  I did ultrasound the area did not see any abscess.  I will have him discontinue the Cipro and Flagyl and start doxycycline to treat more specifically the MRSA.  He is advised this is not 100% helpful in all cases of MRSA and will call colorectal is not having improvement.  He will soak in the bathtub twice a day.  To follow with colorectal of his symptoms or not improving.  He  should follow-up sooner if he has a fever or worsening symptoms    Diagnosis:    ICD-10-CM    1. MRSA infection A49.02      Disposition:  discharged to home    Discharge Medications:  START taking these medications    Details   doxycycline hyclate (VIBRAMYCIN) 100 MG capsule Take 1 capsule (100 mg) by mouth 2 times daily for 10 days, Disp-20 capsule, R-0, Local Print     Louis Mccann  11/9/2019    EMERGENCY DEPARTMENT  I, Louis Mccann, am serving as a scribe at 5:44 PM on 11/9/2019 to document services personally performed by Sydney Piper MD based on my observations and the provider's statements to me.        Sydney Piper MD  11/09/19 7854

## 2020-01-10 ENCOUNTER — OFFICE VISIT (OUTPATIENT)
Dept: FAMILY MEDICINE | Facility: CLINIC | Age: 65
End: 2020-01-10
Payer: COMMERCIAL

## 2020-01-10 VITALS
HEIGHT: 70 IN | WEIGHT: 191 LBS | BODY MASS INDEX: 27.35 KG/M2 | SYSTOLIC BLOOD PRESSURE: 128 MMHG | DIASTOLIC BLOOD PRESSURE: 70 MMHG | TEMPERATURE: 97.5 F | HEART RATE: 78 BPM

## 2020-01-10 DIAGNOSIS — A49.02 MRSA INFECTION: ICD-10-CM

## 2020-01-10 DIAGNOSIS — L08.9 LOCAL INFECTION OF SKIN AND SUBCUTANEOUS TISSUE: Primary | ICD-10-CM

## 2020-01-10 PROCEDURE — 99213 OFFICE O/P EST LOW 20 MIN: CPT | Performed by: FAMILY MEDICINE

## 2020-01-10 ASSESSMENT — MIFFLIN-ST. JEOR: SCORE: 1672.62

## 2020-01-10 NOTE — PROGRESS NOTES
"Subjective     John Oliveira is a 64 year old male who presents to clinic today for the following health issues:    HPI   Concern - talk about MRSA  Onset:     Description:   Would like to talk about MRSA infection.  Has had 3 infections in the last 3 months        There is no problem list on file for this patient.    Past Surgical History:   Procedure Laterality Date     COLONOSCOPY N/A 2019    Procedure: COLONOSCOPY;  Surgeon: Warren Mojica MD;  Location:  GI     ORTHOPEDIC SURGERY Left     foot pins in place        Social History     Tobacco Use     Smoking status: Former Smoker     Last attempt to quit: 2003     Years since quittin.0     Smokeless tobacco: Never Used   Substance Use Topics     Alcohol use: Yes     Comment: rare      No family history on file.      Current Outpatient Medications   Medication Sig Dispense Refill     chlorhexidine (HIBICLENS) 4 % liquid Apply topically daily as needed for wound care 500 mL 0     Allergies   Allergen Reactions     Codeine Sulfate      Penicillins      Recent Labs   Lab Test 13  0000   CR 1.08   GFRESTIMATED 71   GFRESTBLACK 86   POTASSIUM 3.7      BP Readings from Last 3 Encounters:   01/10/20 128/70   19 (!) 145/91   19 123/82    Wt Readings from Last 3 Encounters:   01/10/20 86.6 kg (191 lb)   19 87.1 kg (192 lb)   19 85.7 kg (189 lb)             Reviewed and updated as needed this visit by Provider         Review of Systems   ROS COMP: Constitutional, HEENT, cardiovascular, pulmonary, gi and gu systems are negative, except as otherwise noted.      Objective    /70   Pulse 78   Temp 97.5  F (36.4  C) (Tympanic)   Ht 1.778 m (5' 10\")   Wt 86.6 kg (191 lb)   BMI 27.41 kg/m    Body mass index is 27.41 kg/m .  Physical Exam   GENERAL: healthy, alert and no distress  NECK: no adenopathy, no asymmetry, masses, or scars and thyroid normal to palpation  RESP: lungs clear to auscultation - no rales, rhonchi or " "wheezes  CV: regular rate and rhythm, normal S1 S2, no S3 or S4, no murmur, click or rub, no peripheral edema and peripheral pulses strong  ABDOMEN: soft, nontender, no hepatosplenomegaly, no masses and bowel sounds normal  MS: no gross musculoskeletal defects noted, no edema            Assessment & Plan     1. Local infection of skin and subcutaneous tissue  Has 3 episodes of MRSA, treated with several antibiotics, has no current flares  Will have him to keep using mupirocin nose ointment and trial of Hibiclens bath for prevention  Will have him to keep monitoring   - chlorhexidine (HIBICLENS) 4 % liquid; Apply topically daily as needed for wound care  Dispense: 500 mL; Refill: 0    2. MRSA infection  Mentioned above   - chlorhexidine (HIBICLENS) 4 % liquid; Apply topically daily as needed for wound care  Dispense: 500 mL; Refill: 0     BMI:   Estimated body mass index is 27.41 kg/m  as calculated from the following:    Height as of this encounter: 1.778 m (5' 10\").    Weight as of this encounter: 86.6 kg (191 lb).           FUTURE APPOINTMENTS:       - Follow-up visit in 1 year for CPE    No follow-ups on file.    Duncan Jenkins MD  [facility] [place]      "

## 2020-01-14 ENCOUNTER — OFFICE VISIT (OUTPATIENT)
Dept: FAMILY MEDICINE | Facility: CLINIC | Age: 65
End: 2020-01-14
Payer: COMMERCIAL

## 2020-01-14 VITALS
RESPIRATION RATE: 16 BRPM | BODY MASS INDEX: 27.49 KG/M2 | HEIGHT: 70 IN | WEIGHT: 192 LBS | TEMPERATURE: 97.3 F | DIASTOLIC BLOOD PRESSURE: 70 MMHG | HEART RATE: 107 BPM | OXYGEN SATURATION: 98 % | SYSTOLIC BLOOD PRESSURE: 122 MMHG

## 2020-01-14 DIAGNOSIS — Z86.14 HISTORY OF MRSA INFECTION: ICD-10-CM

## 2020-01-14 DIAGNOSIS — L08.9 LOCAL INFECTION OF SKIN AND SUBCUTANEOUS TISSUE: Primary | ICD-10-CM

## 2020-01-14 LAB
GRAM STN SPEC: ABNORMAL
GRAM STN SPEC: ABNORMAL
Lab: ABNORMAL
SPECIMEN SOURCE: ABNORMAL

## 2020-01-14 PROCEDURE — 87186 SC STD MICRODIL/AGAR DIL: CPT | Performed by: FAMILY MEDICINE

## 2020-01-14 PROCEDURE — 87205 SMEAR GRAM STAIN: CPT | Performed by: FAMILY MEDICINE

## 2020-01-14 PROCEDURE — 87077 CULTURE AEROBIC IDENTIFY: CPT | Performed by: FAMILY MEDICINE

## 2020-01-14 PROCEDURE — 99213 OFFICE O/P EST LOW 20 MIN: CPT | Performed by: FAMILY MEDICINE

## 2020-01-14 PROCEDURE — 87070 CULTURE OTHR SPECIMN AEROBIC: CPT | Performed by: FAMILY MEDICINE

## 2020-01-14 ASSESSMENT — MIFFLIN-ST. JEOR: SCORE: 1677.16

## 2020-01-14 NOTE — PROGRESS NOTES
"Subjective     John Oliveira is a 64 year old male who presents to clinic today for the following health issues:    HPI   Follow-up MRSA      Description (location/character/radiation): Pt felt a new spot on right buttock this morning. Concerned about frequent MRSA infections.          There is no problem list on file for this patient.    Past Surgical History:   Procedure Laterality Date     COLONOSCOPY N/A 2019    Procedure: COLONOSCOPY;  Surgeon: Warren Mojica MD;  Location:  GI     ORTHOPEDIC SURGERY Left     foot pins in place        Social History     Tobacco Use     Smoking status: Former Smoker     Last attempt to quit: 2003     Years since quittin.0     Smokeless tobacco: Never Used   Substance Use Topics     Alcohol use: Yes     Comment: rare      No family history on file.      Current Outpatient Medications   Medication Sig Dispense Refill     chlorhexidine (HIBICLENS) 4 % liquid Apply topically daily as needed for wound care (Patient not taking: Reported on 2020) 500 mL 0     Allergies   Allergen Reactions     Codeine Sulfate      Penicillins      Recent Labs   Lab Test 13  0000   CR 1.08   GFRESTIMATED 71   GFRESTBLACK 86   POTASSIUM 3.7      BP Readings from Last 3 Encounters:   20 122/70   01/10/20 128/70   19 (!) 145/91    Wt Readings from Last 3 Encounters:   20 87.1 kg (192 lb)   01/10/20 86.6 kg (191 lb)   19 87.1 kg (192 lb)        Reviewed and updated as needed this visit by Provider         Review of Systems   ROS COMP: Constitutional, HEENT, cardiovascular, pulmonary, gi and gu systems are negative, except as otherwise noted.      Objective    /70 (Cuff Size: Adult Large)   Pulse 107   Temp 97.3  F (36.3  C) (Tympanic)   Resp 16   Ht 1.778 m (5' 10\")   Wt 87.1 kg (192 lb)   SpO2 98%   BMI 27.55 kg/m    Body mass index is 27.55 kg/m .  Physical Exam   GENERAL: healthy, alert and no distress  NECK: no adenopathy, no asymmetry, " masses, or scars and thyroid normal to palpation  RESP: lungs clear to auscultation - no rales, rhonchi or wheezes  CV: regular rate and rhythm, normal S1 S2, no S3 or S4, no murmur, click or rub, no peripheral edema and peripheral pulses strong  ABDOMEN: soft, nontender, no hepatosplenomegaly, no masses and bowel sounds normal  MS: no gross musculoskeletal defects noted, no edema  SKIN: papular lesion on buttock, has no drainage nor fluctuating, has no tenderness  NEURO: Normal strength and tone, mentation intact and speech normal        Assessment & Plan       ICD-10-CM    1. Local infection of skin and subcutaneous tissue L08.9 Wound Culture Aerobic Bacterial     Gram stain   2. History of MRSA infection Z86.14 Wound Culture Aerobic Bacterial     Gram stain     Has recurring papular lesion on buttock, will check on culture for further evaluation   Encouraged him to try Hibiclens as prescribed, and using mupirocin as well         No follow-ups on file.    Duncan Jenkins MD  [facility] [place]

## 2020-01-14 NOTE — LETTER
[facility] [place]  [address] [address]  Phone: [phone]    January 14, 2020        John Oliveira  [address]  [place] [facility] [place] [address]          To whom it may concern:    RE: John CEM Tee    Patient was seen and treated today at our clinic.    Please contact me for questions or concerns.      Sincerely,        Duncan Jenkins MD

## 2020-01-15 RX ORDER — CLINDAMYCIN HCL 300 MG
300 CAPSULE ORAL 3 TIMES DAILY
Qty: 30 CAPSULE | Refills: 0 | Status: SHIPPED | OUTPATIENT
Start: 2020-01-15 | End: 2022-06-09

## 2020-01-17 ENCOUNTER — TELEPHONE (OUTPATIENT)
Dept: FAMILY MEDICINE | Facility: CLINIC | Age: 65
End: 2020-01-17

## 2020-01-17 LAB
BACTERIA SPEC CULT: ABNORMAL
Lab: ABNORMAL
SPECIMEN SOURCE: ABNORMAL

## 2020-03-19 ENCOUNTER — TELEPHONE (OUTPATIENT)
Dept: FAMILY MEDICINE | Facility: CLINIC | Age: 65
End: 2020-03-19

## 2020-03-19 DIAGNOSIS — L40.9 SCALP PSORIASIS: ICD-10-CM

## 2020-03-19 RX ORDER — FLUOCINONIDE TOPICAL SOLUTION USP, 0.05% 0.5 MG/ML
SOLUTION TOPICAL
Qty: 60 ML | Refills: 0 | Status: SHIPPED | OUTPATIENT
Start: 2020-03-19 | End: 2022-06-09

## 2020-03-19 NOTE — TELEPHONE ENCOUNTER
patient walked into clinic and asked for a refill on medication- advised he will need an evisit or phone visit for further refills    Notes recorded by Devi Cleaning MD on 3/6/2019 at 11:34 AM CST   Encounter : voicemail     Recommendations :      Explained the lesion is not caused by syhpilis      Consistent with sebopsoraisis      Treatment :  Fluocinonide 0.05% solution 3-5 drops to AA bid for 10-14 days       Explained that this condition is controlled not cured       If he would have more area of involvement then recheck to change treatment plan.

## 2020-03-22 ENCOUNTER — HEALTH MAINTENANCE LETTER (OUTPATIENT)
Age: 65
End: 2020-03-22

## 2020-03-25 ENCOUNTER — TELEPHONE (OUTPATIENT)
Dept: FAMILY MEDICINE | Facility: CLINIC | Age: 65
End: 2020-03-25

## 2020-03-25 NOTE — TELEPHONE ENCOUNTER
Reason for Call:  Other appointment    Detailed comments: Pt walked into the clinic to make an appt with Dr. Jenkins. States he has a rash on his back that has been there for about a month. Informed Pt we are scheduling telephone or e-visits. Pt is requesting an in person so that it can be looked at.    Phone Number Patient can be reached at: Cell number on file:    Telephone Information:   Mobile [phone]       Best Time: call after 3:30pm - Pt is at work    Can we leave a detailed message on this number? YES    Call taken on 3/25/2020 at 11:22 AM by Malorie Rios

## 2020-03-26 ENCOUNTER — HOSPITAL ENCOUNTER (EMERGENCY)
Facility: CLINIC | Age: 65
Discharge: HOME | End: 2020-03-26
Attending: EMERGENCY MEDICINE | Admitting: EMERGENCY MEDICINE
Payer: COMMERCIAL

## 2020-03-26 VITALS
SYSTOLIC BLOOD PRESSURE: 142 MMHG | HEART RATE: 79 BPM | HEIGHT: 69 IN | BODY MASS INDEX: 28.14 KG/M2 | DIASTOLIC BLOOD PRESSURE: 93 MMHG | TEMPERATURE: 98.6 F | WEIGHT: 190 LBS | RESPIRATION RATE: 18 BRPM | OXYGEN SATURATION: 96 %

## 2020-03-26 DIAGNOSIS — R21 RASH: ICD-10-CM

## 2020-03-26 PROCEDURE — 99282 EMERGENCY DEPT VISIT SF MDM: CPT

## 2020-03-26 ASSESSMENT — MIFFLIN-ST. JEOR: SCORE: 1652.21

## 2020-03-26 NOTE — ED PROVIDER NOTES
"  History     Chief Complaint:  Dermatology Concern    HPI   John Oliveira is a 64 year old male who presents to the Emergency Department for evaluation for a dermatology concern. The patient reports complaint of multiple dry, flaky patches scattered throughout his body for the past 3-4 months, but saw his first lesion 9 months ago. He notes he has these dry patches on his scalp, right lower extremity, right wrist, back, and abdomen. He has never been seen by dermatology for this recently, but a year ago he had a biopsy of the one on his scalp and was told it was not cancer. He denies any medication changes over the past 1 year. He denies having a history of HIV or Hepatitis C.     Allergies:  Codeine Sulfate  Penicillins    Medications:    The patient is currently on no regular medications.    Past Medical History:    The patient does not have any past pertinent medical history.    Past Surgical History:    Orthopedic surgery, left foot    Family History:    History reviewed. No pertinent family history.     Social History:  The patient presents to the ED alone.  Marital status: Single  Smoking status: Former Smoker; quit 2003  Alcohol use: Yes  Drug use: No  PCP: Duncan Jenkins    Review of Systems   Skin:        Positive for dry, flaky patches of skin.   All other systems reviewed and are negative.    Physical Exam     Patient Vitals for the past 24 hrs:   BP Temp Temp src Pulse Heart Rate Resp SpO2 Height Weight   03/26/20 1644 (!) 142/93 98.6  F (37  C) Temporal 79 79 18 96 % 1.753 m (5' 9\") 86.2 kg (190 lb)       Physical Exam  Physical Exam   General:  Sitting in chair, comfortable appearing.   HENT:  No obvious trauma to head  Right Ear:  External ear normal.   Left Ear:  External ear normal.   Nose:  Nose normal.   Eyes:  Conjunctivae and EOM are normal.  Neck: Normal range of motion. Neck supple. No tracheal deviation present.   Pulm/Chest: No respiratory distress  M/S: Normal range of motion.   Neuro: " Alert. GCS 15.  Skin: Skin is warm and dry. Not diaphoretic. On anterior lower shins, the forehead and forearms, there are approximately 1 dozen lesions that are between 4 mm to 2 cm that are round, dry, flaking skin with no erythema, warmth, or crepitus.   Psych: Normal mood and affect. Behavior is normal.     Emergency Department Course     Emergency Department Course:  Past medical records, nursing notes, and vitals reviewed.  1819: I performed an exam of the patient and obtained history, as documented above.     Findings and plan explained to the patient. Patient discharged home with instructions regarding supportive care and reasons to return. The importance of close follow-up was reviewed.    Impression & Plan      Medical Decision Making:  John Oliveira is a very pleasant 64 year old year old patient who presents to the emergency department with concern of a rash.  He has actually had this for up to 9 months.  He has developed a few more the lesions.  He previously had these biopsied by Dr. Holly.  He had one lesion in the scalp biopsied.  He does not know what the etiology of it was, but was told it was not cancer.  The patient has been unable to follow-up with a dermatologist over the past few months and wanted to have them evaluated now.  There has been no change today.  Because of coronavirus, dermatology clinics are currently shut down.  The patient's lesions appear to be actinic keratosis.  He denies any new medications or being on any medications at all.  There are multiple lesions.  They are dry.  There is no erythema or warmth.  I doubt cellulitis or abscess.  There are no mucosal involvement to suggest erythema multiforme.  May be a variant of psoriasis as some of the lesions are in the anterior shins.  I discussed trialing over-the-counter hydrocortisone cream to one lesion to see if any improvement.  I recommended consulting a dermatologist via online visit which multiple clinics are  currently doing.  The patient may require cryoablation for this in the future.  The patient agreed.    The treatment plan was discussed with the patient and they expressed understanding of this plan and consented to the plan.  In addition, the patient will return to the emergency department if their symptoms persist, worsen, if new symptoms arise or if there is any concern as other pathology may be present that is not evident at this time. They also understand the importance of close follow up in the clinic and if unable to do so will return to the emergency department for a reevaluation. All questions were answered.    Diagnosis:    ICD-10-CM    1. Rash  R21     Suspect that this could be actinic keratosis       Disposition:  discharged to home    Discharge Medications:  New Prescriptions    No medications on file       Justa Carrera  3/26/2020    EMERGENCY DEPARTMENT    Scribe Disclosure:  IJusta, am serving as a scribe at 6:19 PM on 3/26/2020 to document services personally performed by Charly Peña DO based on my observations and the provider's statements to me.        Charly Peña DO  03/26/20 1211

## 2020-03-26 NOTE — ED AVS SNAPSHOT
Emergency Department  [address] [place] [address]  Phone:  [phone]  Fax:  [phone]                                    John Oliveira   MRN: [id number]    Department:   Emergency Department   Date of Visit:  3/26/2020           After Visit Summary Signature Page    I have received my discharge instructions, and my questions have been answered. I have discussed any challenges I see with this plan with the nurse or doctor.    ..........................................................................................................................................  Patient/Patient Representative Signature      ..........................................................................................................................................  Patient Representative Print Name and Relationship to Patient    ..................................................               ................................................  Date                                   Time    ..........................................................................................................................................  Reviewed by Signature/Title    ...................................................              ..............................................  Date                                               Time          22EPIC Rev 08/18

## 2020-03-26 NOTE — ED TRIAGE NOTES
Pt reports having a wound on his back and RLE for months. Pt states that it hasn't gone away and would like a doctor to look at it.

## 2020-12-01 ENCOUNTER — OFFICE VISIT (OUTPATIENT)
Dept: FAMILY MEDICINE | Facility: CLINIC | Age: 65
End: 2020-12-01

## 2020-12-01 VITALS
BODY MASS INDEX: 29.33 KG/M2 | TEMPERATURE: 97.6 F | DIASTOLIC BLOOD PRESSURE: 82 MMHG | SYSTOLIC BLOOD PRESSURE: 124 MMHG | HEIGHT: 69 IN | HEART RATE: 98 BPM | WEIGHT: 198 LBS | OXYGEN SATURATION: 97 %

## 2020-12-01 DIAGNOSIS — Z12.5 SCREENING FOR PROSTATE CANCER: ICD-10-CM

## 2020-12-01 DIAGNOSIS — Z00.00 ROUTINE GENERAL MEDICAL EXAMINATION AT A HEALTH CARE FACILITY: Primary | ICD-10-CM

## 2020-12-01 PROCEDURE — 99396 PREV VISIT EST AGE 40-64: CPT | Performed by: FAMILY MEDICINE

## 2020-12-01 ASSESSMENT — MIFFLIN-ST. JEOR: SCORE: 1683.5

## 2020-12-01 NOTE — PROGRESS NOTES
3  SUBJECTIVE:   CC: John Oliveira is an 65 year old male who presents for preventive health visit.     Patient has been advised of split billing requirements and indicates understanding: Yes  Healthy Habits:    Do you get at least three servings of calcium containing foods daily (dairy, green leafy vegetables, etc.)? no, taking calcium and/or vitamin D supplement: no    Amount of exercise or daily activities, outside of work: 1 day(s) per week    Problems taking medications regularly No    Medication side effects: No    Have you had an eye exam in the past two years? yes    Do you see a dentist twice per year? yes    Do you have sleep apnea, excessive snoring or daytime drowsiness?no      Today's PHQ-2 Score:   PHQ-2 (  [employer]) 2020 1/10/2020   Q1: Little interest or pleasure in doing things 0 0   Q2: Feeling down, depressed or hopeless 0 0   PHQ-2 Score 0 0       Abuse: Current or Past(Physical, Sexual or Emotional)- No  Do you feel safe in your environment? Yes        Social History     Tobacco Use     Smoking status: Former Smoker     Quit date: 2003     Years since quittin.9     Smokeless tobacco: Never Used   Substance Use Topics     Alcohol use: Yes     Comment: rare      If you drink alcohol do you typically have >3 drinks per day or >7 drinks per week? No                      Last PSA: No results found for: PSA    Reviewed orders with patient. Reviewed health maintenance and updated orders accordingly - Yes  BP Readings from Last 3 Encounters:   20 124/82   20 (!) 142/93   20 122/70    Wt Readings from Last 3 Encounters:   20 89.8 kg (198 lb)   20 86.2 kg (190 lb)   20 87.1 kg (192 lb)                  There is no problem list on file for this patient.    Past Surgical History:   Procedure Laterality Date     COLONOSCOPY N/A 2019    Procedure: COLONOSCOPY;  Surgeon: Warren Mojica MD;  Location:  GI     ORTHOPEDIC SURGERY Left     foot pins in  place        Social History     Tobacco Use     Smoking status: Former Smoker     Quit date: 2003     Years since quittin.9     Smokeless tobacco: Never Used   Substance Use Topics     Alcohol use: Yes     Comment: rare      No family history on file.      Current Outpatient Medications   Medication Sig Dispense Refill     chlorhexidine (HIBICLENS) 4 % liquid Apply topically daily as needed for wound care (Patient not taking: Reported on 2020) 500 mL 0     clindamycin (CLEOCIN) 300 MG capsule Take 1 capsule (300 mg) by mouth 3 times daily (Patient not taking: Reported on 2020) 30 capsule 0     fluocinonide (LIDEX) 0.05 % external solution Apply 3-5 drops to AA on scalp bid for 10-14 days (Patient not taking: Reported on 2020) 60 mL 0     Allergies   Allergen Reactions     Codeine Sulfate      Penicillins      Recent Labs   Lab Test 13  0000   CR 1.08   GFRESTIMATED 71   GFRESTBLACK 86   POTASSIUM 3.7        Reviewed and updated as needed this visit by clinical staff                 Reviewed and updated as needed this visit by Provider                No past medical history on file.   Past Surgical History:   Procedure Laterality Date     COLONOSCOPY N/A 2019    Procedure: COLONOSCOPY;  Surgeon: Warren Mojica MD;  Location:  GI     ORTHOPEDIC SURGERY Left     foot pins in place        ROS:  CONSTITUTIONAL: NEGATIVE for fever, chills, change in weight  INTEGUMENTARY/SKIN: NEGATIVE for worrisome rashes, moles or lesions  EYES: NEGATIVE for vision changes or irritation  ENT: NEGATIVE for ear, mouth and throat problems  RESP: NEGATIVE for significant cough or SOB  CV: NEGATIVE for chest pain, palpitations or peripheral edema  GI: NEGATIVE for nausea, abdominal pain, heartburn, or change in bowel habits   male: negative for dysuria, hematuria, decreased urinary stream, erectile dysfunction, urethral discharge  MUSCULOSKELETAL: NEGATIVE for significant arthralgias or myalgia  NEURO:  "NEGATIVE for weakness, dizziness or paresthesias  PSYCHIATRIC: NEGATIVE for changes in mood or affect    OBJECTIVE:   There were no vitals taken for this visit.  EXAM:  GENERAL: healthy, alert and no distress  EYES: Eyes grossly normal to inspection, PERRL and conjunctivae and sclerae normal  HENT: ear canals and TM's normal, nose and mouth without ulcers or lesions  NECK: no adenopathy, no asymmetry, masses, or scars and thyroid normal to palpation  RESP: lungs clear to auscultation - no rales, rhonchi or wheezes  CV: regular rate and rhythm, normal S1 S2, no S3 or S4, no murmur, click or rub, no peripheral edema and peripheral pulses strong  ABDOMEN: soft, nontender, no hepatosplenomegaly, no masses and bowel sounds normal  MS: no gross musculoskeletal defects noted, no edema  SKIN: no suspicious lesions or rashes  NEURO: Normal strength and tone, mentation intact and speech normal  BACK: no CVA tenderness, no paralumbar tenderness  PSYCH: mentation appears normal, affect normal/bright  LYMPH: no cervical, supraclavicular, axillary, or inguinal adenopathy        ASSESSMENT/PLAN:       ICD-10-CM    1. Routine general medical examination at a health care facility  Z00.00 CBC with platelets     Comprehensive metabolic panel (BMP + Alb, Alk Phos, ALT, AST, Total. Bili, TP)     Lipid panel reflex to direct LDL Fasting     PSA, screen   2. Screening for prostate cancer  Z12.5 PSA, screen       Patient has been advised of split billing requirements and indicates understanding: Yes  COUNSELING:  Reviewed preventive health counseling, as reflected in patient instructions    Estimated body mass index is 28.06 kg/m  as calculated from the following:    Height as of 3/26/20: 1.753 m (5' 9\").    Weight as of 3/26/20: 86.2 kg (190 lb).        He reports that he quit smoking about 17 years ago. He has never used smokeless tobacco.      Counseling Resources:  ATP IV Guidelines  Pooled Cohorts Equation Calculator  FRAX Risk " Assessment  ICSI Preventive Guidelines  Dietary Guidelines for Americans, 2010  USDA's MyPlate  ASA Prophylaxis  Lung CA Screening    Duncan Jenkins MD  [facility]

## 2020-12-15 DIAGNOSIS — R97.20 ELEVATED PROSTATE SPECIFIC ANTIGEN (PSA): Primary | ICD-10-CM

## 2020-12-15 DIAGNOSIS — R73.01 ELEVATED FASTING GLUCOSE: ICD-10-CM

## 2020-12-15 DIAGNOSIS — Z00.00 ROUTINE GENERAL MEDICAL EXAMINATION AT A HEALTH CARE FACILITY: ICD-10-CM

## 2020-12-15 DIAGNOSIS — Z12.5 SCREENING FOR PROSTATE CANCER: ICD-10-CM

## 2020-12-15 LAB
ALBUMIN SERPL-MCNC: 3.9 G/DL (ref 3.4–5)
ALP SERPL-CCNC: 81 U/L (ref 40–150)
ALT SERPL W P-5'-P-CCNC: 42 U/L (ref 0–70)
ANION GAP SERPL CALCULATED.3IONS-SCNC: 5 MMOL/L (ref 3–14)
AST SERPL W P-5'-P-CCNC: 27 U/L (ref 0–45)
BILIRUB SERPL-MCNC: 0.8 MG/DL (ref 0.2–1.3)
BUN SERPL-MCNC: 14 MG/DL (ref 7–30)
CALCIUM SERPL-MCNC: 8.6 MG/DL (ref 8.5–10.1)
CHLORIDE SERPL-SCNC: 107 MMOL/L (ref 94–109)
CHOLEST SERPL-MCNC: 163 MG/DL
CO2 SERPL-SCNC: 26 MMOL/L (ref 20–32)
CREAT SERPL-MCNC: 1.06 MG/DL (ref 0.66–1.25)
ERYTHROCYTE [DISTWIDTH] IN BLOOD BY AUTOMATED COUNT: 13.7 % (ref 10–15)
GFR SERPL CREATININE-BSD FRML MDRD: 74 ML/MIN/{1.73_M2}
GLUCOSE SERPL-MCNC: 133 MG/DL (ref 70–99)
HCT VFR BLD AUTO: 42 % (ref 40–53)
HDLC SERPL-MCNC: 56 MG/DL
HGB BLD-MCNC: 15.5 G/DL (ref 13.3–17.7)
LDLC SERPL CALC-MCNC: 86 MG/DL
MCH RBC QN AUTO: 28.3 PG (ref 26.5–33)
MCHC RBC AUTO-ENTMCNC: 36.9 G/DL (ref 31.5–36.5)
MCV RBC AUTO: 77 FL (ref 78–100)
NONHDLC SERPL-MCNC: 107 MG/DL
PLATELET # BLD AUTO: 211 10E9/L (ref 150–450)
POTASSIUM SERPL-SCNC: 3.8 MMOL/L (ref 3.4–5.3)
PROT SERPL-MCNC: 7.9 G/DL (ref 6.8–8.8)
PSA SERPL-ACNC: 21.2 UG/L (ref 0–4)
RBC # BLD AUTO: 5.48 10E12/L (ref 4.4–5.9)
SODIUM SERPL-SCNC: 138 MMOL/L (ref 133–144)
TRIGL SERPL-MCNC: 104 MG/DL
WBC # BLD AUTO: 4.9 10E9/L (ref 4–11)

## 2020-12-15 PROCEDURE — G0103 PSA SCREENING: HCPCS | Performed by: FAMILY MEDICINE

## 2020-12-15 PROCEDURE — 85027 COMPLETE CBC AUTOMATED: CPT | Performed by: FAMILY MEDICINE

## 2020-12-15 PROCEDURE — 80053 COMPREHEN METABOLIC PANEL: CPT | Performed by: FAMILY MEDICINE

## 2020-12-15 PROCEDURE — 36415 COLL VENOUS BLD VENIPUNCTURE: CPT | Performed by: FAMILY MEDICINE

## 2020-12-15 PROCEDURE — 80061 LIPID PANEL: CPT | Performed by: FAMILY MEDICINE

## 2020-12-17 NOTE — TELEPHONE ENCOUNTER
MEDICAL RECORDS REQUEST   [facility]  Urology Clinic  [address] [place] [address]  PHONE: [phone]  Fax: [phone]        FUTURE VISIT INFORMATION                                                   John Oliveira, : 1955 scheduled for future visit at [facility]    APPOINTMENT INFORMATION:    Date: 2021 7AM    Provider:  Sukumar Mckeon MD    Reason for Visit/Diagnosis: Elevated PSA    REFERRAL INFORMATION:    Referring provider:  N/A    Specialty: N/A    Referring providers clinic:      Clinic contact number:  N/A    RECORDS REQUESTED FOR VISIT                                                     NOTES  STATUS/DETAILS   OFFICE NOTE from referring provider  yes   OFFICE NOTE from other specialist  no   DISCHARGE SUMMARY from hospital  no   DISCHARGE REPORT from the ER  no   OPERATIVE REPORT  no   MEDICATION LIST  no   LABS     URINALYSIS (UA)/ PSA  yes     PRE-VISIT CHECKLIST      Record collection complete Yes   Appointment appropriately scheduled           (right time/right provider) Yes   MyChart activation Yes   Questionnaire complete If no, please explain: In process      Completed by: Philly Vinson

## 2020-12-22 DIAGNOSIS — R73.01 ELEVATED FASTING GLUCOSE: ICD-10-CM

## 2020-12-22 LAB — HBA1C MFR BLD: 6.4 % (ref 0–5.6)

## 2020-12-22 PROCEDURE — 83036 HEMOGLOBIN GLYCOSYLATED A1C: CPT | Performed by: FAMILY MEDICINE

## 2020-12-22 PROCEDURE — 82043 UR ALBUMIN QUANTITATIVE: CPT | Performed by: FAMILY MEDICINE

## 2020-12-22 PROCEDURE — 36415 COLL VENOUS BLD VENIPUNCTURE: CPT | Performed by: FAMILY MEDICINE

## 2020-12-23 ENCOUNTER — OFFICE VISIT (OUTPATIENT)
Dept: FAMILY MEDICINE | Facility: CLINIC | Age: 65
End: 2020-12-23

## 2020-12-23 VITALS
BODY MASS INDEX: 29.18 KG/M2 | TEMPERATURE: 97.7 F | HEART RATE: 83 BPM | SYSTOLIC BLOOD PRESSURE: 130 MMHG | HEIGHT: 69 IN | WEIGHT: 197 LBS | DIASTOLIC BLOOD PRESSURE: 78 MMHG | OXYGEN SATURATION: 98 %

## 2020-12-23 DIAGNOSIS — L02.03 CARBUNCLE AND FURUNCLE OF FACE: ICD-10-CM

## 2020-12-23 DIAGNOSIS — L02.02 CARBUNCLE AND FURUNCLE OF FACE: ICD-10-CM

## 2020-12-23 DIAGNOSIS — L72.0 INCLUSION CYST: Primary | ICD-10-CM

## 2020-12-23 LAB
CREAT UR-MCNC: 105 MG/DL
MICROALBUMIN UR-MCNC: <5 MG/L
MICROALBUMIN/CREAT UR: NORMAL MG/G CR (ref 0–17)

## 2020-12-23 PROCEDURE — 99214 OFFICE O/P EST MOD 30 MIN: CPT | Performed by: FAMILY MEDICINE

## 2020-12-23 RX ORDER — CLINDAMYCIN HCL 300 MG
300 CAPSULE ORAL 3 TIMES DAILY
Qty: 21 CAPSULE | Refills: 0 | Status: SHIPPED | OUTPATIENT
Start: 2020-12-23 | End: 2020-12-30

## 2020-12-23 ASSESSMENT — MIFFLIN-ST. JEOR: SCORE: 1678.97

## 2020-12-23 NOTE — PROGRESS NOTES
"Subjective     John Oliveira is a 65 year old male who presents to clinic today for the following health issues:    HPI         Concern - skin growth  Onset: 2 days  Description: Swollen area on the side of his face - tender to touch - soreness - thought initially it was an ingrown hair  Intensity: moderate  Progression of Symptoms:  worsening  Accompanying Signs & Symptoms: swollen  Noticed slight raised bump on the left side of the face.  Slight tender to touch he notices increasing in size he tried to squeeze it but nothing came out.  No fever no chills.        Review of Systems   Constitutional, HEENT, cardiovascular, pulmonary, gi and gu systems are negative, except as otherwise noted.      Objective    /78 (BP Location: Right arm, Cuff Size: Adult Regular)   Pulse 83   Temp 97.7  F (36.5  C) (Tympanic)   Ht 1.753 m (5' 9\")   Wt 89.4 kg (197 lb)   SpO2 98%   BMI 29.09 kg/m    Body mass index is 29.09 kg/m .  Physical Exam   GENERAL: healthy, alert and no distress  RESP: lungs clear to auscultation - no rales, rhonchi or wheezes  CV: regular rate and rhythm, normal S1 S2, no S3 or S4, no murmur, click or rub, no peripheral edema and peripheral pulses strong  Area on the left side of the face is slight raised is no fluctuance present.  Slight tender to touch not warm to touch.        Assessment & Plan     Inclusion cyst  Patient has slight raised possible cyst on the face which is slight tender and increasing in size this could be mild infection I will start him on clindamycin warm compresses if it is improving-reassuring but if is getting bigger he needs to be seen in the urgent care ER versus skin care for further evaluation.  Warning signs were discussed with the patient antibiotic side effects were discussed  - clindamycin (CLEOCIN) 300 MG capsule; Take 1 capsule (300 mg) by mouth 3 times daily for 7 days    Carbuncle and furuncle of face    - clindamycin (CLEOCIN) 300 MG capsule; Take 1 " capsule (300 mg) by mouth 3 times daily for 7 days          Gume Padilla MD  [facility]

## 2021-01-05 ENCOUNTER — PRE VISIT (OUTPATIENT)
Dept: UROLOGY | Facility: CLINIC | Age: 66
End: 2021-01-05

## 2021-01-05 NOTE — TELEPHONE ENCOUNTER
Visit Type : Elevated PSA    Records/Orders: PSA in epic     Pt Contacted: no    At Rooming: video

## 2021-01-15 ENCOUNTER — HEALTH MAINTENANCE LETTER (OUTPATIENT)
Age: 66
End: 2021-01-15

## 2021-01-21 ENCOUNTER — PRE VISIT (OUTPATIENT)
Dept: UROLOGY | Facility: CLINIC | Age: 66
End: 2021-01-21

## 2021-03-01 ENCOUNTER — OFFICE VISIT (OUTPATIENT)
Dept: FAMILY MEDICINE | Facility: CLINIC | Age: 66
End: 2021-03-01
Payer: COMMERCIAL

## 2021-03-01 VITALS
BODY MASS INDEX: 30.07 KG/M2 | HEART RATE: 90 BPM | DIASTOLIC BLOOD PRESSURE: 86 MMHG | TEMPERATURE: 97.5 F | SYSTOLIC BLOOD PRESSURE: 132 MMHG | HEIGHT: 69 IN | OXYGEN SATURATION: 92 % | WEIGHT: 203 LBS

## 2021-03-01 DIAGNOSIS — R73.03 PREDIABETES: ICD-10-CM

## 2021-03-01 DIAGNOSIS — R03.0 ELEVATED BLOOD PRESSURE READING WITHOUT DIAGNOSIS OF HYPERTENSION: ICD-10-CM

## 2021-03-01 DIAGNOSIS — R97.20 ELEVATED PROSTATE SPECIFIC ANTIGEN (PSA): Primary | ICD-10-CM

## 2021-03-01 PROCEDURE — 99214 OFFICE O/P EST MOD 30 MIN: CPT | Performed by: FAMILY MEDICINE

## 2021-03-01 ASSESSMENT — MIFFLIN-ST. JEOR: SCORE: 1706.18

## 2021-03-01 NOTE — PROGRESS NOTES
"    Assessment & Plan     Elevated prostate specific antigen (PSA)  Seeing urology and scheduled surgery for prostate cancer soon, will have him to [facility] for preop before surgery      Elevated blood pressure reading without diagnosis of hypertension  Has slightly elevated BP around borderline, encouraged him to keep working on life style modification for BP and prediabetes     Prediabetes  Encouraged him life style modification, will recheck a1c in 3 months       30 minutes spent on the date of the encounter doing chart review, history and exam, documentation and further activities as noted above       FUTURE APPOINTMENTS:       - Follow-up visit in 3 months for prediabetes     No follow-ups on file.    Duncan Jenkins MD  [facility] ABDOUL Lopez is a 65 year old who presents for the following health issues     HPI       Concern - follow up  Onset:   Description: Follow up on labs and bp has been high        Review of Systems   Constitutional, HEENT, cardiovascular, pulmonary, gi and gu systems are negative, except as otherwise noted.      Objective    /86   Pulse 90   Temp 97.5  F (36.4  C) (Tympanic)   Ht 1.753 m (5' 9\")   Wt 92.1 kg (203 lb)   SpO2 92%   BMI 29.98 kg/m    Body mass index is 29.98 kg/m .  Physical Exam   GENERAL: healthy, alert and no distress  NECK: no adenopathy, no asymmetry, masses, or scars and thyroid normal to palpation  RESP: lungs clear to auscultation - no rales, rhonchi or wheezes  CV: regular rate and rhythm, normal S1 S2, no S3 or S4, no murmur, click or rub, no peripheral edema and peripheral pulses strong  ABDOMEN: soft, nontender, no hepatosplenomegaly, no masses and bowel sounds normal  MS: no gross musculoskeletal defects noted, no edema              "

## 2021-03-10 ENCOUNTER — IMMUNIZATION (OUTPATIENT)
Dept: NURSING | Facility: CLINIC | Age: 66
End: 2021-03-10
Payer: COMMERCIAL

## 2021-03-10 PROCEDURE — 0031A PR COVID VAC JANSSEN AD26 0.5ML: CPT

## 2021-03-10 PROCEDURE — 91303 PR COVID VAC JANSSEN AD26 0.5ML: CPT

## 2021-03-28 ENCOUNTER — NURSE TRIAGE (OUTPATIENT)
Dept: NURSING | Facility: CLINIC | Age: 66
End: 2021-03-28

## 2021-03-28 NOTE — TELEPHONE ENCOUNTER
Patient states he had Prostate surgery at [facility] 2 days ago. Calling reporting he feels constipated and wondering if he can use suppository medication. RN advised patient to contact his surgery team at [facility] if patient can use suppository. Caller verbalized understanding. Denies further questions.      Steve Olivares RN  [facility]

## 2021-07-13 ENCOUNTER — OFFICE VISIT (OUTPATIENT)
Dept: FAMILY MEDICINE | Facility: CLINIC | Age: 66
End: 2021-07-13
Payer: COMMERCIAL

## 2021-07-13 VITALS
WEIGHT: 198 LBS | OXYGEN SATURATION: 98 % | HEIGHT: 69 IN | BODY MASS INDEX: 29.33 KG/M2 | TEMPERATURE: 97.6 F | HEART RATE: 87 BPM | DIASTOLIC BLOOD PRESSURE: 76 MMHG | RESPIRATION RATE: 16 BRPM | SYSTOLIC BLOOD PRESSURE: 126 MMHG

## 2021-07-13 DIAGNOSIS — N39.46 MIXED INCONTINENCE: ICD-10-CM

## 2021-07-13 DIAGNOSIS — C61 PROSTATE CANCER (H): Primary | ICD-10-CM

## 2021-07-13 PROCEDURE — 99213 OFFICE O/P EST LOW 20 MIN: CPT | Performed by: FAMILY MEDICINE

## 2021-07-13 RX ORDER — TADALAFIL 5 MG/1
5 TABLET ORAL DAILY
COMMUNITY
Start: 2021-05-04 | End: 2022-06-09

## 2021-07-13 ASSESSMENT — MIFFLIN-ST. JEOR: SCORE: 1678.5

## 2021-07-13 ASSESSMENT — PAIN SCALES - GENERAL: PAINLEVEL: NO PAIN (0)

## 2021-07-13 NOTE — PROGRESS NOTES
"    Assessment & Plan     Prostate cancer (H)  Has incontinence, encouraged him to keep working [employer] exercise and monitoring closely   - tadalafil (CIALIS) 5 MG tablet; Take 5 mg by mouth daily    Mixed incontinence  Mentioned above   - tadalafil (CIALIS) 5 MG tablet; Take 5 mg by mouth daily      23 minutes spent on the date of the encounter doing chart review, history and exam, documentation and further activities per the note       BMI:   Estimated body mass index is 29.24 kg/m  as calculated from the following:    Height as of this encounter: 1.753 m (5' 9\").    Weight as of this encounter: 89.8 kg (198 lb).   Weight management plan: Discussed healthy diet and exercise guidelines    FUTURE APPOINTMENTS:       - Follow-up visit in 6 months for CPE    No follow-ups on file.    Duncan Jenkins MD  [facility] 66 [facility] who presents for the following health issues     HPI     Genitourinary - Male  Onset/Duration: since March 25 surgery  Description:   Dysuria (painful urination): no}  Hematuria (blood in urine): no  Frequency: no  Waking at night to urinate: YES  Hesitancy (delay in urine): no  Retention (unable to empty): no  Decrease in urinary flow: no  Incontinence: YES  Progression of Symptoms:  improving  Accompanying Signs & Symptoms:  Fever: no  Back/Flank pain: no  Urethral discharge: no  Testicle lumps/masses/pain: no  Nausea and/or vomiting: no  Abdominal pain: no  History:   History of frequent UTI s: no  History of kidney stones: no  History of hernias: no  Personal or Family history of Prostate problems: YES- father  Sexually active: no  Precipitating or alleviating factors: None  Therapies tried and outcome: none        Review of Systems   Constitutional, HEENT, cardiovascular, pulmonary, gi and gu systems are negative, except as otherwise noted.      Objective    /76   Pulse 87   Temp 97.6  F (36.4  C) (Tympanic)   Resp 16   Ht 1.753 m " "(5' 9\")   Wt 89.8 kg (198 lb)   SpO2 98%   BMI 29.24 kg/m    Body mass index is 29.24 kg/m .  Physical Exam   GENERAL: healthy, alert and no distress  NECK: no adenopathy, no asymmetry, masses, or scars and thyroid normal to palpation  RESP: lungs clear to auscultation - no rales, rhonchi or wheezes  CV: regular rate and rhythm, normal S1 S2, no S3 or S4, no murmur, click or rub, no peripheral edema and peripheral pulses strong  ABDOMEN: soft, nontender, no hepatosplenomegaly, no masses and bowel sounds normal  MS: no gross musculoskeletal defects noted, no edema                "

## 2021-09-05 ENCOUNTER — HEALTH MAINTENANCE LETTER (OUTPATIENT)
Age: 66
End: 2021-09-05

## 2022-02-20 ENCOUNTER — HEALTH MAINTENANCE LETTER (OUTPATIENT)
Age: 67
End: 2022-02-20

## 2022-06-06 NOTE — TELEPHONE ENCOUNTER
Encounter : voicemail    Recommendations :      Explained the lesion is not caused by syhpilis     Consistent with sebopsoraisis     Treatment :  Fluocinonide 0.05% solution 3-5 drops to AA bid for 10-14 days      Explained that this condition is controlled not cured      If he would have more area of involvement then recheck to change treatment plan.     Left voicemail    Shave, scalp vertex:   - Psoriasiform and spongiotic dermatitis - see comment and description     COMMENT:   Together with the clinical information, the features seen in this specimen    are the type that m  ay be seen in   sebopsoriasis of the scalp. The scalp is generally rich in plasmacellular   inflammation and treponemal stains   are negative. Clinical correlation is recommended.   I have personally reviewed all specimens and/or slides, including the   listed special stains, and used them   with my medical judgement to determine or confirm the final diagnosis.    Is This A New Presentation, Or A Follow-Up?: Skin Lesion How Severe Is Your Skin Lesion?: moderate Have Your Skin Lesions Been Treated?: not been treated

## 2022-06-09 ENCOUNTER — OFFICE VISIT (OUTPATIENT)
Dept: FAMILY MEDICINE | Facility: CLINIC | Age: 67
End: 2022-06-09
Payer: COMMERCIAL

## 2022-06-09 VITALS
HEIGHT: 69 IN | DIASTOLIC BLOOD PRESSURE: 86 MMHG | OXYGEN SATURATION: 97 % | SYSTOLIC BLOOD PRESSURE: 132 MMHG | WEIGHT: 200 LBS | TEMPERATURE: 97.3 F | RESPIRATION RATE: 16 BRPM | HEART RATE: 81 BPM | BODY MASS INDEX: 29.62 KG/M2

## 2022-06-09 DIAGNOSIS — L98.9 HAND LESION: Primary | ICD-10-CM

## 2022-06-09 PROCEDURE — 99213 OFFICE O/P EST LOW 20 MIN: CPT | Performed by: PHYSICIAN ASSISTANT

## 2022-06-09 ASSESSMENT — PAIN SCALES - GENERAL: PAINLEVEL: NO PAIN (0)

## 2022-06-09 NOTE — PROGRESS NOTES
"Assessment and Plan:     (L98.9) Hand lesion  (primary encounter diagnosis)  Comment: left thumb, saw ortho but did not follow-up for removal options, no e/o infection, no change in ROM  Plan: follow-up with ortho      Sondra Sales PA-C        Subjective   John is a 66 year old who presents for the following health issues  HPI     He has a lesion on left thumb  He cut it about a year ago, had a flap laceration that he treated himself with pressure, then developed large scar in area  It is not painful but annoying and can be tender to the touch at time  He denies any drainage, redness or issues with range of motion   Saw ortho in February but didn't follow-up (Celsa Teague)    Review of Systems   See above      Objective    /86 (BP Location: Left arm, Patient Position: Sitting, Cuff Size: Adult Large)   Pulse 81   Temp 97.3  F (36.3  C) (Temporal)   Resp 16   Ht 1.753 m (5' 9\")   Wt 90.7 kg (200 lb)   SpO2 97%   BMI 29.53 kg/m    Body mass index is 29.53 kg/m .     Physical Exam     GENERAL: in NAD, delightful  RESP: lungs clear to auscultation - no rales, no rhonchi, no wheezes  CV: regular rates and rhythm, normal S1 S2, no S3 or S4 and no murmur, no click or rub   MS: extremities- round lesion left thumb at IP joint, about size of pea, non-tender no skin changes, no surrounding erythema, full ROM    "

## 2022-07-29 ENCOUNTER — HOSPITAL ENCOUNTER (EMERGENCY)
Facility: CLINIC | Age: 67
Discharge: HOME | End: 2022-07-30
Attending: EMERGENCY MEDICINE | Admitting: EMERGENCY MEDICINE
Payer: COMMERCIAL

## 2022-07-29 DIAGNOSIS — L72.3 SEBACEOUS CYST OF FINGER: ICD-10-CM

## 2022-07-29 PROCEDURE — 99283 EMERGENCY DEPT VISIT LOW MDM: CPT | Mod: 25

## 2022-07-29 PROCEDURE — 10060 I&D ABSCESS SIMPLE/SINGLE: CPT

## 2022-07-30 VITALS
HEIGHT: 69 IN | TEMPERATURE: 98.8 F | OXYGEN SATURATION: 96 % | DIASTOLIC BLOOD PRESSURE: 96 MMHG | SYSTOLIC BLOOD PRESSURE: 134 MMHG | WEIGHT: 196 LBS | HEART RATE: 69 BPM | BODY MASS INDEX: 29.03 KG/M2 | RESPIRATION RATE: 16 BRPM

## 2022-07-30 ASSESSMENT — ENCOUNTER SYMPTOMS
NEUROLOGICAL NEGATIVE: 1
CONSTITUTIONAL NEGATIVE: 1
WOUND: 1
PSYCHIATRIC NEGATIVE: 1

## 2022-07-30 NOTE — DISCHARGE INSTRUCTIONS
There are no signs of serious infection at this time, and I do not think you need antibiotics, though you are at some risk of reaccumulation of fluid/oily material in the cyst, and I recommend that you keep your appointment for further care by an Orthopedic Hand Specialist.

## 2022-07-30 NOTE — ED PROVIDER NOTES
"  History   Chief Complaint:  Wound Check     The history is provided by the patient and medical records.      John Oliveira is a right-handed 67 year old male with history of stage 3A prostate cancer, MRSA, and tobacco use disorder who presents for a wound check. The patient reports he has had a cyst on his left thumb for months and was scheduled to see a provider on 7/18, but his appointment was cancelled. He reports over the past few days he saw there was a \"sewell\" developing on the cyst and when he squeezed it a stream of brown discharge came out. He states he had to put a Band-Aid on his thumb today because it looked \"unsightly.\"  He states that he sustained a deep gash on his thumb in this area about a year ago, but he \"pressed it back in there\" and taped it up. The patient works in [employer] as a  and will be retiring in December of next year. He reports no medical problems.    Review of Systems   Constitutional: Negative.    Skin: Positive for wound (L thumb cyst).   Neurological: Negative.    Psychiatric/Behavioral: Negative.      Allergies:  Codeine Sulfate  Penicillins    Medications:  Cialis  Revatio    Past Medical History:     Stage 3A prostate cancer  Tobacco use disorder (quit 6/2008)  MRSA    Past Surgical History:    Colonoscopy  Left foot surgery    Social History:  The patient presents to the ED alone  Work in manufacturing as a   Retires December of this year    Physical Exam     Patient Vitals for the past 24 hrs:   BP Temp Temp src Pulse Resp SpO2 Height Weight   07/30/22 0000 (!) 134/96 98.8  F (37.1  C) -- -- -- -- -- --   07/29/22 2012 135/80 (!) 96.7  F (35.9  C) Temporal 69 16 96 % 1.753 m (5' 9\") 88.9 kg (196 lb)     Physical Exam  General: Nontoxic-appearing male who ambulated into room to  HENT: wearing mask, no major facial deformity noted  CV:  regular rhythm, soft compartments in LUE, cap refill normal in L thumb  Resp: normal effort, speaks in full " phrases, no stridor, no cough observed  MSK:  Extremities: Firm and minimally fluctuant swelling overlying the dorsal aspect of the left DIP joint approximately 1 cm in diameter which is slightly open, no bleeding, no surrounding rash visible  Skin:   No crepitus to left thumb hand or wrist  Neuro: awake, alert, responds appropriately to commands, SILT in LUE  Psych: cooperative, pleasant    Emergency Department Course     Procedures    Procedure: Incision and Drainage   Performed by: Marlon Gonzalez MD    LOCATION:  L thumb      ANESTHESIA:  None (declined by pt)    PREPARATION:  Cleansed with Betadine    PROCEDURE:  Area was incised with # 11 Blade (Sharp Point) with a Single Straight incision.  Wound treatment included expression of thick grayish sebaceous material and Expression of Material.  Appropriate dressing was applied to cover the area.    Patient Status:  Patient tolerated the procedure moderately well. There were no complications evident    Emergency Department Course:     Reviewed:  I reviewed nursing notes, vitals, past medical history and Care Everywhere    Assessments:  2334 I obtained history and examined the patient as noted above.   2337 I drained the cyst on the patient's thumb.     Disposition:  The patient was discharged to home.     Impression & Plan     Medical Decision Making:  He has had some longstanding swelling to his part of his thumb after remote injury, and became concerned that he was able to express some thick material from it.  We discussed the possibility of digital block though he wished to proceed instead directly to an incision over the slightly fluctuant mass, and I was able to express a significant amount of sebaceous material.  No pus and no signs of infection such as rash or evidence of cellulitis.  No evidence of necrotizing infection I do not think this represents a septic joint.  Do not think antibiotics are indicated.  Discussed with him that he seems to have  evidence of a cyst cavity, and is at risk for recurrence and ongoing problems, for which I recommended outpatient orthopedic hand specialty follow-up, which he already has arranged in the near future.  He is content with this plan, satisfied with results achieved, and discharged home in improved condition.    Diagnosis:    ICD-10-CM    1. Sebaceous cyst of finger  L72.3      Scribe Disclosure:  I, Jef Sparks, am serving as a scribe at 11:33 PM on 7/29/2022 to document services personally performed by Marlon Gonzalez MD based on my observations and the provider's statements to me.        Marlon Gonzalez MD  07/30/22 0675

## 2022-08-06 ENCOUNTER — HEALTH MAINTENANCE LETTER (OUTPATIENT)
Age: 67
End: 2022-08-06

## 2022-08-29 ENCOUNTER — OFFICE VISIT (OUTPATIENT)
Dept: FAMILY MEDICINE | Facility: CLINIC | Age: 67
End: 2022-08-29
Payer: COMMERCIAL

## 2022-08-29 VITALS
TEMPERATURE: 96.8 F | DIASTOLIC BLOOD PRESSURE: 66 MMHG | HEART RATE: 94 BPM | SYSTOLIC BLOOD PRESSURE: 134 MMHG | WEIGHT: 194.4 LBS | RESPIRATION RATE: 16 BRPM | BODY MASS INDEX: 28.79 KG/M2 | OXYGEN SATURATION: 97 % | HEIGHT: 69 IN

## 2022-08-29 DIAGNOSIS — N52.9 ERECTILE DYSFUNCTION, UNSPECIFIED ERECTILE DYSFUNCTION TYPE: ICD-10-CM

## 2022-08-29 DIAGNOSIS — Z12.5 SCREENING FOR PROSTATE CANCER: ICD-10-CM

## 2022-08-29 DIAGNOSIS — Z00.00 HEALTHCARE MAINTENANCE: Primary | ICD-10-CM

## 2022-08-29 DIAGNOSIS — C61 PROSTATE CANCER (H): ICD-10-CM

## 2022-08-29 DIAGNOSIS — Z23 NEED FOR VACCINATION: ICD-10-CM

## 2022-08-29 LAB
ERYTHROCYTE [DISTWIDTH] IN BLOOD BY AUTOMATED COUNT: 14 % (ref 10–15)
HCT VFR BLD AUTO: 43.7 % (ref 40–53)
HGB BLD-MCNC: 16.2 G/DL (ref 13.3–17.7)
MCH RBC QN AUTO: 28.3 PG (ref 26.5–33)
MCHC RBC AUTO-ENTMCNC: 37.1 G/DL (ref 31.5–36.5)
MCV RBC AUTO: 76 FL (ref 78–100)
PLATELET # BLD AUTO: 229 10E3/UL (ref 150–450)
RBC # BLD AUTO: 5.72 10E6/UL (ref 4.4–5.9)
WBC # BLD AUTO: 4.5 10E3/UL (ref 4–11)

## 2022-08-29 PROCEDURE — 80061 LIPID PANEL: CPT | Performed by: FAMILY MEDICINE

## 2022-08-29 PROCEDURE — 90471 IMMUNIZATION ADMIN: CPT | Performed by: FAMILY MEDICINE

## 2022-08-29 PROCEDURE — 36415 COLL VENOUS BLD VENIPUNCTURE: CPT | Performed by: FAMILY MEDICINE

## 2022-08-29 PROCEDURE — 85027 COMPLETE CBC AUTOMATED: CPT | Performed by: FAMILY MEDICINE

## 2022-08-29 PROCEDURE — 90677 PCV20 VACCINE IM: CPT | Performed by: FAMILY MEDICINE

## 2022-08-29 PROCEDURE — 90472 IMMUNIZATION ADMIN EACH ADD: CPT | Performed by: FAMILY MEDICINE

## 2022-08-29 PROCEDURE — 90715 TDAP VACCINE 7 YRS/> IM: CPT | Performed by: FAMILY MEDICINE

## 2022-08-29 PROCEDURE — 99397 PER PM REEVAL EST PAT 65+ YR: CPT | Mod: 25 | Performed by: FAMILY MEDICINE

## 2022-08-29 PROCEDURE — 80053 COMPREHEN METABOLIC PANEL: CPT | Performed by: FAMILY MEDICINE

## 2022-08-29 PROCEDURE — G0103 PSA SCREENING: HCPCS | Performed by: FAMILY MEDICINE

## 2022-08-29 RX ORDER — SILDENAFIL CITRATE 20 MG/1
20 TABLET ORAL 3 TIMES DAILY
COMMUNITY
End: 2022-08-29

## 2022-08-29 RX ORDER — SILDENAFIL CITRATE 20 MG/1
TABLET ORAL
Qty: 60 TABLET | Refills: 3 | Status: SHIPPED | OUTPATIENT
Start: 2022-08-29 | End: 2023-07-24

## 2022-08-29 RX ORDER — TADALAFIL 5 MG/1
5 TABLET ORAL EVERY 24 HOURS
Status: CANCELLED | OUTPATIENT
Start: 2022-08-29

## 2022-08-29 RX ORDER — TADALAFIL 5 MG/1
5 TABLET ORAL EVERY 24 HOURS
COMMUNITY
End: 2023-07-24

## 2022-08-29 ASSESSMENT — ENCOUNTER SYMPTOMS
ABDOMINAL PAIN: 0
HEADACHES: 0
HEARTBURN: 0
NERVOUS/ANXIOUS: 0
CHILLS: 0
HEMATOCHEZIA: 0
HEMATURIA: 0
FREQUENCY: 0
NAUSEA: 0
DIZZINESS: 0
EYE PAIN: 0
JOINT SWELLING: 0
WEAKNESS: 0
CONSTIPATION: 0
FEVER: 0
ARTHRALGIAS: 0
SORE THROAT: 0
PALPITATIONS: 0
DIARRHEA: 0
SHORTNESS OF BREATH: 0
MYALGIAS: 0
COUGH: 0
PARESTHESIAS: 0
DYSURIA: 0

## 2022-08-29 ASSESSMENT — ACTIVITIES OF DAILY LIVING (ADL): CURRENT_FUNCTION: NO ASSISTANCE NEEDED

## 2022-08-29 ASSESSMENT — PAIN SCALES - GENERAL: PAINLEVEL: NO PAIN (0)

## 2022-08-29 NOTE — PROGRESS NOTES
"SUBJECTIVE:   John Oliveira is a 67 year old male who presents for Preventive Visit.      Patient has been advised of split billing requirements and indicates understanding: Yes  Are you in the first 12 months of your Medicare coverage?  No    Healthy Habits:     In general, how would you rate your overall health?  Good    Frequency of exercise:  1 day/week    Duration of exercise:  30-45 minutes    Do you usually eat at least 4 servings of fruit and vegetables a day, include whole grains    & fiber and avoid regularly eating high fat or \"junk\" foods?  Yes    Taking medications regularly:  Yes    Medication side effects:  None    Ability to successfully perform activities of daily living:  No assistance needed    Home Safety:  No safety concerns identified    Hearing Impairment:  No hearing concerns    In the past 6 months, have you been bothered by leaking of urine? Yes    In general, how would you rate your overall mental or emotional health?  Good      PHQ-2 Total Score: 0    Additional concerns today:  No    Do you feel safe in your environment? Yes    Have you ever done Advance Care Planning? (For example, a Health Directive, POLST, or a discussion with a medical provider or your loved ones about your wishes): No, advance care planning information given to patient to review.  Patient declined advance care planning discussion at this time.       Fall risk  Fallen 2 or more times in the past year?: No  Any fall with injury in the past year?: No    Cognitive Screening   1) Repeat 3 items (Leader, Season, Table)    2) Clock draw: NORMAL  3) 3 item recall: Recalls 3 objects  Results: 3 items recalled: COGNITIVE IMPAIRMENT LESS LIKELY    Mini-CogTM Copyright FABIO Galvez. Licensed by the author for use in [facility]; reprinted with permission (ayla@.[facility]). All rights reserved.      Do you have sleep apnea, excessive snoring or daytime drowsiness?: no    Reviewed and updated as needed this visit by " clinical staff                    Reviewed and updated as needed this visit by Provider                   Social History     Tobacco Use     Smoking status: Former Smoker     Quit date: 2003     Years since quittin.6     Smokeless tobacco: Never Used   Substance Use Topics     Alcohol use: Yes     Comment: rare          Alcohol Use 2022   Prescreen: >3 drinks/day or >7 drinks/week? No           Refills, stronger shampoo than Nizoral    Current providers sharing in care for this patient include:   Patient Care Team:  Duncan Jenkins MD as PCP - General (Family Practice)  Duncan Jenkins MD as Assigned PCP  Sukumar Mckeon MD as MD (Urology)  Maria L Sullivan, RN as Registered Nurse (Oncology)    The following health maintenance items are reviewed in Epic and correct as of today:  Health Maintenance Due   Topic Date Due     ZOSTER IMMUNIZATION (1 of 2) Never done     MEDICARE ANNUAL WELLNESS VISIT  2022     AORTIC ANEURYSM SCREENING (SYSTEM ASSIGNED)  Never done     ANNUAL REVIEW OF HM ORDERS  2022     DTAP/TDAP/TD IMMUNIZATION (2 - Td or Tdap) 2022     Pneumococcal Vaccine: 65+ Years (1 - PCV) 2022     INFLUENZA VACCINE (1) 2022     BP Readings from Last 3 Encounters:   22 134/66   22 (!) 134/96   22 132/86    Wt Readings from Last 3 Encounters:   22 88.2 kg (194 lb 6.4 oz)   22 88.9 kg (196 lb)   22 90.7 kg (200 lb)                  There is no problem list on file for this patient.    Past Surgical History:   Procedure Laterality Date     COLONOSCOPY N/A 2019    Procedure: COLONOSCOPY;  Surgeon: Warren Mojica MD;  Location:  GI     ORTHOPEDIC SURGERY Left     foot pins in place        Social History     Tobacco Use     Smoking status: Former Smoker     Quit date: 2003     Years since quittin.6     Smokeless tobacco: Never Used   Substance Use Topics     Alcohol use: Yes     Comment: rare      No family history  on file.      Current Outpatient Medications   Medication Sig Dispense Refill     sildenafil (REVATIO) 20 MG tablet Take 20- 100 mg by mouth as needed for sexual intercourse 60 tablet 3     tadalafil (CIALIS) 5 MG tablet Take 5 mg by mouth every 24 hours       Allergies   Allergen Reactions     Codeine Sulfate      Pcn [Penicillins]      Recent Labs   Lab Test 12/22/20  1109 12/15/20  1006   A1C 6.4*  --    LDL  --  86   HDL  --  56   TRIG  --  104   ALT  --  42   CR  --  1.06   GFRESTIMATED  --  74   GFRESTBLACK  --  86   POTASSIUM  --  3.8      Pneumonia Vaccine:For adults with an immunocompromising condition, cerebrospinal fluid leak, or cochlear implant, administer 1 dose of PCV13 first then give 1 dose of PPSV23 at least 1 year later. Anyone who received any doses of PPSV23 before age 65 should receive 1 final dose of the vaccine at age 65 or older. Administer this last dose at least 5 years after the prior PPSV23 dose.        Review of Systems   Constitutional: Negative for chills and fever.   HENT: Negative for congestion, ear pain, hearing loss and sore throat.    Eyes: Negative for pain and visual disturbance.   Respiratory: Negative for cough and shortness of breath.    Cardiovascular: Negative for chest pain, palpitations and peripheral edema.   Gastrointestinal: Negative for abdominal pain, constipation, diarrhea, heartburn, hematochezia and nausea.   Genitourinary: Positive for urgency. Negative for dysuria, frequency, genital sores, hematuria, impotence and penile discharge.   Musculoskeletal: Negative for arthralgias, joint swelling and myalgias.   Skin: Negative for rash.   Neurological: Negative for dizziness, weakness, headaches and paresthesias.   Psychiatric/Behavioral: Negative for mood changes. The patient is not nervous/anxious.          OBJECTIVE:   /66 (BP Location: Right arm, Patient Position: Sitting, Cuff Size: Adult Large)   Pulse 94   Temp 96.8  F (36  C) (Tympanic)   Resp 16   " Ht 1.755 m (5' 9.09\")   Wt 88.2 kg (194 lb 6.4 oz)   SpO2 97%   BMI 28.63 kg/m   Estimated body mass index is 28.63 kg/m  as calculated from the following:    Height as of this encounter: 1.755 m (5' 9.09\").    Weight as of this encounter: 88.2 kg (194 lb 6.4 oz).  Physical Exam  GENERAL: healthy, alert and no distress  EYES: Eyes grossly normal to inspection, PERRL and conjunctivae and sclerae normal  HENT: ear canals and TM's normal, nose and mouth without ulcers or lesions  NECK: no adenopathy, no asymmetry, masses, or scars and thyroid normal to palpation  RESP: lungs clear to auscultation - no rales, rhonchi or wheezes  CV: regular rate and rhythm, normal S1 S2, no S3 or S4, no murmur, click or rub, no peripheral edema and peripheral pulses strong  ABDOMEN: soft, nontender, no hepatosplenomegaly, no masses and bowel sounds normal  MS: no gross musculoskeletal defects noted, no edema  SKIN: no suspicious lesions or rashes  NEURO: Normal strength and tone, mentation intact and speech normal  BACK: no CVA tenderness, no paralumbar tenderness  PSYCH: mentation appears normal, affect normal/bright  LYMPH: no cervical, supraclavicular, axillary, or inguinal adenopathy        ASSESSMENT / PLAN:   (Z00.00) Healthcare maintenance  (primary encounter diagnosis)  Plan: CBC with platelets, Comprehensive metabolic         panel (BMP + Alb, Alk Phos, ALT, AST, Total.         Bili, TP), Lipid panel reflex to direct LDL         Fasting, PSA, screen            (Z12.5) Screening for prostate cancer  Plan: PSA, screen            (C61) Prostate cancer (H)  Plan: PSA, screen            (N52.9) Erectile dysfunction, unspecified erectile dysfunction type  Comment: has been on 2 different ED med, will have him to make it one as prn dosing   New prescription of sildenafil sent   Plan: sildenafil (REVATIO) 20 MG tablet            (Z23) Need for vaccination  Plan: TDAP VACCINE (Adacel, Boostrix)  [[id number]],         Pneumococcal 20 " "Valent Conjugate (Prevnar 20)              Patient has been advised of split billing requirements and indicates understanding: Yes    COUNSELING:  Reviewed preventive health counseling, as reflected in patient instructions    Estimated body mass index is 28.63 kg/m  as calculated from the following:    Height as of this encounter: 1.755 m (5' 9.09\").    Weight as of this encounter: 88.2 kg (194 lb 6.4 oz).    Weight management plan: Discussed healthy diet and exercise guidelines    He reports that he quit smoking about 19 years ago. He has never used smokeless tobacco.      Appropriate preventive services were discussed with this patient, including applicable screening as appropriate for cardiovascular disease, diabetes, osteopenia/osteoporosis, and glaucoma.  As appropriate for age/gender, discussed screening for colorectal cancer, prostate cancer, breast cancer, and cervical cancer. Checklist reviewing preventive services available has been given to the patient.    Reviewed patients plan of care and provided an AVS. The Basic Care Plan (routine screening as documented in Health Maintenance) for John meets the Care Plan requirement. This Care Plan has been established and reviewed with the Patient.    Counseling Resources:  ATP IV Guidelines  Pooled Cohorts Equation Calculator  Breast Cancer Risk Calculator  Breast Cancer: Medication to Reduce Risk  FRAX Risk Assessment  ICSI Preventive Guidelines  Dietary Guidelines for Americans, 2010  [employer]'s MyPlate  ASA Prophylaxis  Lung CA Screening    Duncan Jenkins MD  [facility]    Identified Health Risks:  "

## 2022-08-30 LAB
ALBUMIN SERPL-MCNC: 4 G/DL (ref 3.4–5)
ALP SERPL-CCNC: 82 U/L (ref 40–150)
ALT SERPL W P-5'-P-CCNC: 37 U/L (ref 0–70)
ANION GAP SERPL CALCULATED.3IONS-SCNC: 9 MMOL/L (ref 3–14)
AST SERPL W P-5'-P-CCNC: 30 U/L (ref 0–45)
BILIRUB SERPL-MCNC: 1.4 MG/DL (ref 0.2–1.3)
BUN SERPL-MCNC: 12 MG/DL (ref 7–30)
CALCIUM SERPL-MCNC: 8.9 MG/DL (ref 8.5–10.1)
CHLORIDE BLD-SCNC: 105 MMOL/L (ref 94–109)
CHOLEST SERPL-MCNC: 149 MG/DL
CO2 SERPL-SCNC: 22 MMOL/L (ref 20–32)
CREAT SERPL-MCNC: 0.97 MG/DL (ref 0.66–1.25)
FASTING STATUS PATIENT QL REPORTED: YES
GFR SERPL CREATININE-BSD FRML MDRD: 87 ML/MIN/1.73M2
GLUCOSE BLD-MCNC: 119 MG/DL (ref 70–99)
HDLC SERPL-MCNC: 59 MG/DL
LDLC SERPL CALC-MCNC: 76 MG/DL
NONHDLC SERPL-MCNC: 90 MG/DL
POTASSIUM BLD-SCNC: 3.9 MMOL/L (ref 3.4–5.3)
PROT SERPL-MCNC: 7.4 G/DL (ref 6.8–8.8)
PSA SERPL-MCNC: <0.01 UG/L (ref 0–4)
SODIUM SERPL-SCNC: 136 MMOL/L (ref 133–144)
TRIGL SERPL-MCNC: 72 MG/DL

## 2022-10-19 ENCOUNTER — OFFICE VISIT (OUTPATIENT)
Dept: FAMILY MEDICINE | Facility: CLINIC | Age: 67
End: 2022-10-19
Payer: COMMERCIAL

## 2022-10-19 VITALS
BODY MASS INDEX: 28.14 KG/M2 | TEMPERATURE: 97.8 F | DIASTOLIC BLOOD PRESSURE: 89 MMHG | SYSTOLIC BLOOD PRESSURE: 125 MMHG | WEIGHT: 191.1 LBS

## 2022-10-19 DIAGNOSIS — Z71.84 TRAVEL ADVICE ENCOUNTER: Primary | ICD-10-CM

## 2022-10-19 PROCEDURE — 90632 HEPA VACCINE ADULT IM: CPT | Mod: GA | Performed by: NURSE PRACTITIONER

## 2022-10-19 PROCEDURE — 90472 IMMUNIZATION ADMIN EACH ADD: CPT | Mod: GA | Performed by: NURSE PRACTITIONER

## 2022-10-19 PROCEDURE — 90691 TYPHOID VACCINE IM: CPT | Mod: GA | Performed by: NURSE PRACTITIONER

## 2022-10-19 PROCEDURE — 90662 IIV NO PRSV INCREASED AG IM: CPT | Performed by: NURSE PRACTITIONER

## 2022-10-19 PROCEDURE — 99403 PREV MED CNSL INDIV APPRX 45: CPT | Mod: 25 | Performed by: NURSE PRACTITIONER

## 2022-10-19 PROCEDURE — 90713 POLIOVIRUS IPV SC/IM: CPT | Mod: GA | Performed by: NURSE PRACTITIONER

## 2022-10-19 PROCEDURE — 90717 YELLOW FEVER VACCINE SUBQ: CPT | Mod: GA | Performed by: NURSE PRACTITIONER

## 2022-10-19 PROCEDURE — 90471 IMMUNIZATION ADMIN: CPT | Performed by: NURSE PRACTITIONER

## 2022-10-19 RX ORDER — AZITHROMYCIN 500 MG/1
500 TABLET, FILM COATED ORAL DAILY
Qty: 3 TABLET | Refills: 0 | Status: SHIPPED | OUTPATIENT
Start: 2022-10-19 | End: 2022-10-22

## 2022-10-19 RX ORDER — ATOVAQUONE AND PROGUANIL HYDROCHLORIDE 250; 100 MG/1; MG/1
1 TABLET, FILM COATED ORAL DAILY
Qty: 26 TABLET | Refills: 0 | Status: SHIPPED | OUTPATIENT
Start: 2022-10-19 | End: 2024-04-09

## 2022-10-19 NOTE — NURSING NOTE
Prior to immunization administration, verified patients identity using patient s name and date of birth. Please see Immunization Activity for additional information.     Screening Questionnaire for Adult Immunization    Are you sick today?   No   Do you have allergies to medications, food, a vaccine component or latex?   No   Have you ever had a serious reaction after receiving a vaccination?   No   Do you have a long-term health problem with heart, lung, kidney, or metabolic disease (e.g., diabetes), asthma, a blood disorder, no spleen, complement component deficiency, a cochlear implant, or a spinal fluid leak?  Are you on long-term aspirin therapy?   No   Do you have cancer, leukemia, HIV/AIDS, or any other immune system problem?   No   Do you have a parent, brother, or sister with an immune system problem?   No   In the past 3 months, have you taken medications that affect  your immune system, such as prednisone, other steroids, or anticancer drugs; drugs for the treatment of rheumatoid arthritis, Crohn s disease, or psoriasis; or have you had radiation treatments?   No   Have you had a seizure, or a brain or other nervous system problem?   No   During the past year, have you received a transfusion of blood or blood    products, or been given immune (gamma) globulin or antiviral drug?   No   For women: Are you pregnant or is there a chance you could become       pregnant during the next month?   No   Have you received any vaccinations in the past 4 weeks?   No     Immunization questionnaire answers were all negative.        Per orders of Dr. Raymond, injection of HepA, Typhoid, Polio, YF, Flu given by Fior Wray RN. Patient instructed to remain in clinic for 15 minutes afterwards, and to report any adverse reaction to me immediately.       Screening performed by Fior Wray RN on 10/19/2022 at 3:49 PM.

## 2022-10-19 NOTE — PATIENT INSTRUCTIONS
Thank you for visiting the [facility] : [phone]  Today October 19, 2022 you received the    Flu Vaccine    Hepatitis A Vaccine - Please return on 4/17/23 or later for your 2nd and final dose.    Yellow Fever (YF)    Polio (IPV) Vaccine    Typhoid - injectable. This vaccine is valid for two years.     Follow up vaccine appointments can be made as a NURSE ONLY visit at the Travel Clinic, (BE PREPARED TO WAIT, ) or at designated [facility].    If you are receiving the Rabies vaccines series, it is important that you follow the exact schedule ordered.     Pre-travel     We recommend that you purchase Medical Evacuation Insurance prior to your departure.  Https://wwwnc.cdc.gov/travel/page/insurance    [place] your travel plans with the [employer] Department of State through STEP ( Smart Traveler Enrollment Program ) https://step.state.gov.  STEP is a free service to allow U[place] citizens and nationals traveling and living abroad to enroll their trip with the nearest U.S. Embassy or Consulate.    Animal Exposure: Avoid all mammals even if they look healthy.  If there is a bite, scratch or even a lick, wash area immediately with soap and water for 15 minutes and seek medical care within 24 hours for evaluation of Rabies post exposure treatment.  Contact your Medical Evacuation Insurance.    COVID 19 (Sars Cov2) prevention strategies  Physical distancing: Maintain 6 foot (2m) from others.              Avoid large gatherings and public transportation.   Avoid indoor shopping malls, theaters and restaurants   Practice consistent mask wearing covering the nose, mouth and underneath the chin when unable to maintain 6 foot distance from others.  Hand washing: frequent, thorough handwashing with soap and water for 20 seconds (or using a hand  containing 60% alcohol)   Avoid touching face, nose, eyes, mouth unless you have done appropriate hand washing as above.   Clean high  touch surfaces with approved disinfectant against Covid 19  (70% Ethanol ) or a bleach solution (add 20 mL (4 teaspoons) of bleach to 1 L (1 quart) of water;)  Be careful not to breath or touch bleach.      Travel Covid 19 Testing:  updated 12/06/2021  International travelers: Pre-travel: diagnostic testing (antigen or PCR) may be required for entry:  See country specific Embassy websites or airline websites.    Post travel: CDC recommends getting tested 3-5 days after your trip     COVID-19 testing scheduling number for pre-travel through [facility]  [phone] (Must have an order). Available 24 hours a day.  You can also schedule through My Chart.     Post-travel illness:  Contact your provider or [facility] if you develop a fever, rash, cough, diarrhea or other symptoms for up to 1 year after travel.  Inform your healthcare provider when and where you traveled to.    Please call the [email or web address] with any questions [phone]  Or send your provider a 'My Chart' note.

## 2022-10-19 NOTE — PROGRESS NOTES
Nurse Note      Itinerary:  [place], [place]      Departure Date: 11/04      Return Date: 11/19      Length of Trip 2 weeks      Reason for Travel: [place] work           Urban or rural: both      Accommodations: Hotel        IMMUNIZATION HISTORY  Have you received any immunizations within the past 4 weeks?  Yes - COVID booster  Have you ever fainted from having your blood drawn or from an injection?  No  Have you ever had a fever reaction to vaccination?  No  Have you ever had any bad reaction or side effect from any vaccination?  No  Have you ever had hepatitis A or B vaccine?  No  Do you live (or work closely) with anyone who has AIDS, an AIDS-like condition, any other immune disorder or who is on chemotherapy for cancer?  No  Do you have a family history of immunodeficiency?  No  Have you received any injection of immune globulin or any blood products during the past 12 months?  No    Patient roomed by Fior Wray RN      Subjective  John Oliveira is a 67 year old male seen today alone for counsultation for international travel.   Patient will be departing in  3 week(s) and  traveling a mission group.      Patient itinerary :  will be in the urban region of [place] and [facility] and unknow other locations which risk for Malaria and Yellow Fever. exposure.      Patient's activities will include volunteer work and travel with youth.    Patient's country of birth is [place]    Special medical concerns: none  Pre-travel questionnaire was completed by patient and reviewed by provider.     Vitals: /89 (BP Location: Right arm, Patient Position: Sitting, Cuff Size: Adult Regular)   Temp 97.8  F (36.6  C) (Temporal)   Wt 86.7 kg (191 lb 1.6 oz)   BMI 28.14 kg/m    BMI= Body mass index is 28.14 kg/m .    EXAM:  General:  Well-nourished, well-developed in no acute distress.  Appears to be stated age, interacts appropriately and expresses understanding of information given to patient.    Current Outpatient Medications    Medication Sig Dispense Refill     sildenafil (REVATIO) 20 MG tablet Take 20- 100 mg by mouth as needed for sexual intercourse 60 tablet 3     tadalafil (CIALIS) 5 MG tablet Take 5 mg by mouth every 24 hours       There is no problem list on file for this patient.    Allergies   Allergen Reactions     Codeine Sulfate      Pcn [Penicillins]          Immunizations discussed include:   Covid 19: Up to date  Hepatitis A:  Ordered/given today, risks, benefits and side effects reviewed  Hepatitis B: Declined  Limited time prior to departure  Influenza: Ordered/given today, risks, benefits and side effects reviewed  Typhoid: Ordered/given today, risks, benefits and side effects reviewed  Rabies: Declined  reviewed managment of a animal bite or scratch (washing wound, seek medical care within 24 hours for post exposure prophylaxis ) and Insufficient time to vaccinate  Yellow Fever: Yellow Fever ordered/given today - side effects, precautions, allergies, risks discussed. Patient expressed understanding.  Lithuanian Encephalitis: Not indicated  Meningococcus: declined, off season and short stay   Tetanus/Diphtheria: Up to date  Measles/Mumps/Rubella: Immune by disease history per patient report  Cholera: Not needed  Polio: Ordered/given today, risks, benefits and side effects reviewed  Pneumococcal: Up to date  Varicella: Immune by disease history per patient report  Shingrix: deferred  HPV:  Not indicated     TB: low risk short stay    Altitude Exposure on this trip: no  Past tolerance to Altitude: na    ASSESSMENT/PLAN:  John was seen today for travel clinic.    Diagnoses and all orders for this visit:    Travel advice encounter  -     atovaquone-proguanil (MALARONE) 250-100 MG tablet; Take 1 tablet by mouth daily Start 2 days before exposure to Malaria and continue daily till  7 days after exposure.  -     azithromycin (ZITHROMAX) 500 MG tablet; Take 1 tablet (500 mg) by mouth daily for 3 doses Take 1 tablet a day for up  to 3 days for severe diarrhea    Other orders  -     YELLOW FEVER IMMUNIZATN,LIVE,SUBCUT  -     HEPATITIS A VACCINE (ADULT)  -     TYPHOID VACCINE, IM  -     INFLUENZA, QUAD, HIGH DOSE, PF, 65YR + (FLUZONE HD)  -     IPV, IM/SUBQ (6+ WKS)      I have reviewed general recommendations for safe travel   including: food/water precautions, insect precautions, safer sex   practices given high prevalence of Zika, HIV and other STDs,   roadway safety. Educational materials and [employer] report provided.    Malaraia prophylaxis recommended: Malarone  Symptomatic treatment for traveler's diarrhea: azithromycin      Personal protective measures reviewed including hand sanitizing and contact precautions for the prevention of viral illnesses. Cover coughs and masking  during travel and upon return.  Current COVID 19 pandemic.   Monitor / follow current CDC guidelines.        Evacuation insurance advised and resources were provided to patient.    Total visit time 40 minutes  with over 50% of time spent counseling patient as detailed above.    Chary Raymond CNP  Certificate in Travel Health

## 2022-12-21 ENCOUNTER — E-VISIT (OUTPATIENT)
Dept: URGENT CARE | Facility: CLINIC | Age: 67
End: 2022-12-21
Payer: COMMERCIAL

## 2022-12-21 DIAGNOSIS — J06.9 VIRAL URI WITH COUGH: Primary | ICD-10-CM

## 2022-12-21 PROCEDURE — 99421 OL DIG E/M SVC 5-10 MIN: CPT | Performed by: NURSE PRACTITIONER

## 2022-12-21 RX ORDER — BENZONATATE 100 MG/1
100 CAPSULE ORAL 3 TIMES DAILY PRN
Qty: 30 CAPSULE | Refills: 0 | Status: SHIPPED | OUTPATIENT
Start: 2022-12-21 | End: 2023-07-24

## 2022-12-21 NOTE — PATIENT INSTRUCTIONS
"  Dear John Oliveira    After reviewing your responses, I've been able to diagnose you with \"Bronchitis\" which is a common infection of your lungs that is nearly always caused by a virus. The virus causes swelling and irritation of the air passages of your lungs which leads to cough. The illness spreads from your nose and throat to your windpipe and airways. It is often called a \"chest cold\" and can last up to 2 weeks, but is not a serious illness. Exposure to cigarette smoke usually makes this significantly worse.      To treat bronchitis, the main thing to do is drink lots of fluids and rest. Cough medications over-the-counter such as mucinex, robitussin or \"cold and sinus\" medications can be helpful. Ibuprofen and Tylenol also help with fevers or aching feelings that you often have with this kind of illness. Do not take ibuprofen if you have kidney disease, stomach ulcers or allergy to aspirin.     Bronchitis is most often highly contagious as viruses are spread through the air or touch. Avoid contact with others who may become infected, particularly children, the elderly and those whose immune systems might be weak.     If your symptoms worsen, you develop chest pain or shortness of breath, fevers over 101, or are not improving in 5 days, please contact your primary care provider for an appointment or visit any of our convenient Walk-in Care or Urgent Care Centers to be seen which can be found on our website here.    Thanks again for choosing us as your health care partner,    CHRISTOPHER Woodard CNP  "

## 2023-02-07 ENCOUNTER — TELEPHONE (OUTPATIENT)
Dept: FAMILY MEDICINE | Facility: CLINIC | Age: 68
End: 2023-02-07
Payer: COMMERCIAL

## 2023-02-07 NOTE — TELEPHONE ENCOUNTER
Patient Contact     Attempt # 1     Was call answered?    No  Left non-detailed message to call the clinic back at [phone].      On call back:      -Triage symptoms, see note below.    Kelsea ERNST RN  [facility]

## 2023-02-07 NOTE — TELEPHONE ENCOUNTER
Reason for Call:  Appointment Request    Patient requesting this type of appt:  Patient was seen by Dr. Jenkins in December and tested negative for Covid, Rsv and Flu. Patient has continued cold and cough sx's that won't go away. Looking to see if Dr. Jenkins has any options for him like medications he can try, and if needs to be seen would like to be fitted in to her schedule asap. Thank  You (he is willing to see another provider if cannot get in to see Dr. Jenkins)    Requested provider: Gregory  Reason patient unable to be scheduled: Dr. Jenkins does not have any in person visits until March. Patient wants to be seen in person and needs sooner than March.  Virtual was offered. However, patient wants in person visit.   When does patient want to be seen/preferred time: No appointments after 2:30 pm. Needs late morning, early afternoon because works evenings. Friday would be perfect as he is off after 11 am to early afternoon or later. 1 pm would be absolutely perfect on Friday.   Comments: None    Could we send this information to you in [employer] or would you prefer to receive a phone call?:  Call Back to [phone]    Call taken on 2/7/2023 at 5:07 PM by Eda Bloom

## 2023-02-08 NOTE — TELEPHONE ENCOUNTER
Pt scheduled 2/10 with PCP    Stephy Amado/Marcelo-  [facility]

## 2023-02-08 NOTE — TELEPHONE ENCOUNTER
FYI - Status Update    Who is Calling: Patient did not want to be triaged. He just wants to see if Dr. Jenkins can fit him in asap for an appointment in person. He tested negative for covid, rsv, and flu, but he continues to cough. Can someone ask Dr. Jenkins if can be seen in person? Also, can she fit him into his schedule? Thank you : )      Does caller want a call/response back:  Call Back [phone]

## 2023-02-08 NOTE — TELEPHONE ENCOUNTER
Please see note below. Pt wants to be seen in-person by PCP, no availability. Nurse attempted to reach out to pt and triage symptoms to which he is refusing. Per note below he tested negative for Covid, RSV, and flu but continues to have a cough. Can same day or other slot be used to schedule in-person with PCP? Please review/advise.    Kelsea ERNST RN  [facility]

## 2023-02-10 ENCOUNTER — VIRTUAL VISIT (OUTPATIENT)
Dept: FAMILY MEDICINE | Facility: CLINIC | Age: 68
End: 2023-02-10
Payer: COMMERCIAL

## 2023-02-10 DIAGNOSIS — J20.9 ACUTE BRONCHITIS WITH SYMPTOMS > 10 DAYS: Primary | ICD-10-CM

## 2023-02-10 DIAGNOSIS — C61 PROSTATE CANCER (H): ICD-10-CM

## 2023-02-10 PROCEDURE — 99213 OFFICE O/P EST LOW 20 MIN: CPT | Mod: 93 | Performed by: FAMILY MEDICINE

## 2023-02-10 RX ORDER — BENZONATATE 100 MG/1
100 CAPSULE ORAL 2 TIMES DAILY PRN
Qty: 20 CAPSULE | Refills: 0 | Status: SHIPPED | OUTPATIENT
Start: 2023-02-10 | End: 2024-04-09

## 2023-02-10 RX ORDER — CEFDINIR 300 MG/1
300 CAPSULE ORAL 2 TIMES DAILY
Qty: 20 CAPSULE | Refills: 0 | Status: SHIPPED | OUTPATIENT
Start: 2023-02-10 | End: 2023-07-24

## 2023-02-10 NOTE — PROGRESS NOTES
John is a 67 year old who is being evaluated via a billable telephone visit.      What phone number would you like to be contacted at? [phone]  How would you like to obtain your AVS? MyChart    Distant Location (provider location):  On-site    Assessment & Plan     Prostate cancer (H)      Acute bronchitis with symptoms > 10 days  Worsening again after ER visit 2 weeks ago, has mild to moderate SOB and wheezing with tiredness, will have him to try abx   - cefdinir (OMNICEF) 300 MG capsule; Take 1 capsule (300 mg) by mouth 2 times daily  - benzonatate (TESSALON) 100 MG capsule; Take 1 capsule (100 mg) by mouth 2 times daily as needed for cough      FUTURE APPOINTMENTS:       - Follow-up visit in 1-2 weeks if not improving     No follow-ups on file.    Duncan Jenkins MD  [facility] is [facility] 67 [facility] presenting for the following health issues:  ER F/U      HPI     ED/UC Followup:    Facility:  [address] [place] [address]  Date of visit: 12/21/2022  Reason for visit: Cough and cold symptoms - Neg covid   Current Status: Cough has improved - but worsening over the last 4-5 days, dry cough    Acute Illness  Acute illness concerns: cough   Onset/Duration: 2 weeks   Symptoms:  Fever: No  Chills/Sweats: YES  Headache (location?): YES  Sinus Pressure: No  Conjunctivitis:  No  Ear Pain: no  Rhinorrhea: YES  Congestion: YES  Sore Throat: YES  Cough: YES-non-productive  Wheeze: No  Decreased Appetite: No  Nausea: No  Vomiting: No  Diarrhea: No  Dysuria/Freq.: No  Dysuria or Hematuria: No  Fatigue/Achiness: No  Sick/Strep Exposure: No  Therapies tried and outcome: None    Review of Systems   Constitutional, HEENT, cardiovascular, pulmonary, gi and gu systems are negative, except as otherwise noted.      Objective           Vitals:  No vitals were obtained today due to virtual visit.    Physical Exam   healthy, alert and no distress  PSYCH: Alert and oriented times 3; coherent speech,  normal   rate and volume, able to articulate logical thoughts, able   to abstract reason, no tangential thoughts, no hallucinations   or delusions  His affect is normal  RESP: No cough, no audible wheezing, able to talk in full sentences  Remainder of exam unable to be completed due to telephone visits          Phone call duration: 27 minutes

## 2023-03-09 ENCOUNTER — OFFICE VISIT (OUTPATIENT)
Dept: FAMILY MEDICINE | Facility: CLINIC | Age: 68
End: 2023-03-09
Payer: COMMERCIAL

## 2023-03-09 ENCOUNTER — ANCILLARY PROCEDURE (OUTPATIENT)
Dept: GENERAL RADIOLOGY | Facility: CLINIC | Age: 68
End: 2023-03-09
Attending: FAMILY MEDICINE
Payer: COMMERCIAL

## 2023-03-09 VITALS
DIASTOLIC BLOOD PRESSURE: 85 MMHG | SYSTOLIC BLOOD PRESSURE: 133 MMHG | RESPIRATION RATE: 16 BRPM | TEMPERATURE: 98.6 F | BODY MASS INDEX: 28.58 KG/M2 | HEART RATE: 89 BPM | HEIGHT: 69 IN | OXYGEN SATURATION: 98 % | WEIGHT: 193 LBS

## 2023-03-09 DIAGNOSIS — J01.90 ACUTE SINUSITIS WITH SYMPTOMS > 10 DAYS: ICD-10-CM

## 2023-03-09 DIAGNOSIS — R05.2 SUBACUTE COUGH: Primary | ICD-10-CM

## 2023-03-09 DIAGNOSIS — R05.2 SUBACUTE COUGH: ICD-10-CM

## 2023-03-09 PROCEDURE — 71046 X-RAY EXAM CHEST 2 VIEWS: CPT | Mod: TC | Performed by: RADIOLOGY

## 2023-03-09 PROCEDURE — 99214 OFFICE O/P EST MOD 30 MIN: CPT | Performed by: FAMILY MEDICINE

## 2023-03-09 RX ORDER — PREDNISONE 10 MG/1
TABLET ORAL
Qty: 8 TABLET | Refills: 0 | Status: SHIPPED | OUTPATIENT
Start: 2023-03-09 | End: 2023-03-19

## 2023-03-09 RX ORDER — DOXYCYCLINE 100 MG/1
100 CAPSULE ORAL 2 TIMES DAILY
Qty: 20 CAPSULE | Refills: 0 | Status: SHIPPED | OUTPATIENT
Start: 2023-03-09 | End: 2023-07-24

## 2023-03-09 ASSESSMENT — PAIN SCALES - GENERAL: PAINLEVEL: NO PAIN (0)

## 2023-03-09 NOTE — PROGRESS NOTES
"  Assessment & Plan     Subacute cough  Treated with omnicef and not improving, cxr showed no acute infiltration   - XR Chest 2 Views; Future    Acute sinusitis with symptoms > 10 days  Has purulent postnasal drainage, will have him to try abx with different agents   - doxycycline hyclate (VIBRAMYCIN) 100 MG capsule; Take 1 capsule (100 mg) by mouth 2 times daily  - predniSONE (DELTASONE) 10 MG tablet; Take 1 tablet (10 mg) by mouth daily for 5 days, THEN 0.5 tablets (5 mg) daily for 5 days.           BMI:   Estimated body mass index is 28.5 kg/m  as calculated from the following:    Height as of this encounter: 1.753 m (5' 9\").    Weight as of this encounter: 87.5 kg (193 lb).   Weight management plan: Discussed healthy diet and exercise guidelines    FUTURE APPOINTMENTS:       - Follow-up visit in 2 weeks if not improving     No follow-ups on file.    Duncan Jenkins MD  [facility] ABDOUL Lopez is a 67 year old presenting for the following health issues:  No chief complaint on file.      History of Present Illness       Reason for visit:  Lingering cough    He eats 2-3 servings of fruits and vegetables daily.He consumes 1 sweetened beverage(s) daily.He exercises with enough effort to increase his heart rate 10 to 19 minutes per day.  He exercises with enough effort to increase his heart rate 3 or less days per week.   He is taking medications regularly.       Acute Illness  Acute illness concerns: cough    Onset/Duration: 2-3 months   Symptoms:  Fever: No  Chills/Sweats: No  Headache (location?): No  Sinus Pressure: YES  Conjunctivitis:  No  Ear Pain: no  Rhinorrhea: YES  Congestion: YES  Sore Throat: No  Cough: YES  Wheeze: YES  Decreased Appetite: No  Nausea: No  Vomiting: No  Diarrhea: No  Dysuria/Freq.: No  Dysuria or Hematuria: No  Fatigue/Achiness: No  Sick/Strep Exposure: No  Therapies tried and outcome: None      Review of Systems   Constitutional, HEENT, cardiovascular, " "pulmonary, gi and gu systems are negative, except as otherwise noted.      Objective    /85   Pulse 89   Temp 98.6  F (37  C) (Temporal)   Resp 16   Ht 1.753 m (5' 9\")   Wt 87.5 kg (193 lb)   SpO2 98%   BMI 28.50 kg/m    Body mass index is 28.5 kg/m .  Physical Exam   GENERAL: healthy, alert and no distress  NECK: no adenopathy, no asymmetry, masses, or scars and thyroid normal to palpation  RESP: lungs clear to auscultation - no rales, rhonchi or wheezes  CV: regular rate and rhythm, normal S1 S2, no S3 or S4, no murmur, click or rub, no peripheral edema and peripheral pulses strong  ABDOMEN: soft, nontender, no hepatosplenomegaly, no masses and bowel sounds normal  MS: no gross musculoskeletal defects noted, no edema                    "

## 2023-03-24 ENCOUNTER — TELEPHONE (OUTPATIENT)
Dept: FAMILY MEDICINE | Facility: CLINIC | Age: 68
End: 2023-03-24
Payer: COMMERCIAL

## 2023-03-24 NOTE — TELEPHONE ENCOUNTER
Reason for Call:  Appointment Request    Patient requesting this type of appt: Chronic Diease Management/Medication/Follow-Up    Requested provider: Virus not going away    Reason patient unable to be scheduled:no visits    When does patient want to be seen/preferred time: anytime    Comments: no    Could we send this information to you in [place] or would you prefer to receive a phone call?:   Ok to leave message - please call    Call taken on 3/24/2023 at 9:18 AM by Charlotte Ca

## 2023-03-24 NOTE — TELEPHONE ENCOUNTER
Spoke with patient and looking at Dr Jenkins's schedule, he has no openings. Patient continues with non productive cough and is asking if any provider would mind seeing  him on Monday.     He is uncertain if he just needs a prescription for lingering cough .     No fever or pain with cough.       Patient feels his immune system is down due to prostate cancer.       He completed his antibiotic.       Appointment scheduled       Maryann Delgado RN  [facility]

## 2023-07-24 ENCOUNTER — OFFICE VISIT (OUTPATIENT)
Dept: FAMILY MEDICINE | Facility: CLINIC | Age: 68
End: 2023-07-24
Payer: MEDICARE

## 2023-07-24 VITALS
WEIGHT: 191.5 LBS | BODY MASS INDEX: 27.41 KG/M2 | SYSTOLIC BLOOD PRESSURE: 132 MMHG | HEIGHT: 70 IN | DIASTOLIC BLOOD PRESSURE: 84 MMHG | TEMPERATURE: 97.5 F | HEART RATE: 70 BPM | OXYGEN SATURATION: 95 % | RESPIRATION RATE: 16 BRPM

## 2023-07-24 DIAGNOSIS — Z12.5 SCREENING FOR PROSTATE CANCER: ICD-10-CM

## 2023-07-24 DIAGNOSIS — J32.9 PURULENT POSTNASAL DRAINAGE: Primary | ICD-10-CM

## 2023-07-24 DIAGNOSIS — C61 PROSTATE CANCER (H): ICD-10-CM

## 2023-07-24 LAB — PSA SERPL DL<=0.01 NG/ML-MCNC: <0.01 NG/ML (ref 0–4.5)

## 2023-07-24 PROCEDURE — 99213 OFFICE O/P EST LOW 20 MIN: CPT | Performed by: FAMILY MEDICINE

## 2023-07-24 PROCEDURE — G0103 PSA SCREENING: HCPCS | Performed by: FAMILY MEDICINE

## 2023-07-24 PROCEDURE — 36415 COLL VENOUS BLD VENIPUNCTURE: CPT | Performed by: FAMILY MEDICINE

## 2023-07-24 RX ORDER — AZELASTINE 1 MG/ML
1 SPRAY, METERED NASAL 2 TIMES DAILY
Qty: 30 ML | Refills: 3 | Status: SHIPPED | OUTPATIENT
Start: 2023-07-24 | End: 2024-04-09

## 2023-07-24 ASSESSMENT — PAIN SCALES - GENERAL: PAINLEVEL: NO PAIN (0)

## 2023-07-24 NOTE — PROGRESS NOTES
"  Assessment & Plan     Purulent postnasal drainage  Worsening with cough, and SOB  Will have him to try antihistamine nasal spray and recheck with  me in 6-7 weeks  Will consider referral to pulmonology to assess if he has COPD  - azelastine (ASTELIN) 0.1 % nasal spray; Spray 1 spray into both nostrils 2 times daily    Prostate cancer (H)    - PSA, screen; Future    Screening for prostate cancer    - PSA, screen; Future           FUTURE APPOINTMENTS:       - Follow-up visit in 6-7 weeks     Duncan Jenkins MD  [facility] is a 68 year old, presenting for the following health issues:  RECHECK (Cancer screening and shingles vaccination.)        7/24/2023     3:11 PM   Additional Questions   Roomed by Marcie METCALF     History of Present Illness       Reason for visit:  Cancer screen    He eats 4 or more servings of fruits and vegetables daily.He consumes 1 sweetened beverage(s) daily.He exercises with enough effort to increase his heart rate 30 to 60 minutes per day.  He exercises with enough effort to increase his heart rate 4 days per week.   He is taking medications regularly.         Allergies  Onset/Duration: 3-4 months   Symptoms:   Nasal congestion: No  Sneezing: No  Red, itchy eyes: No  Progression of Symptoms: same waxing and waning  Accompanying Signs & Symptoms:  Cough: YES  Wheezing: YES  Rash: No  Sinus/facial pain: No  History:   Is it seasonal: unclear    History of Asthma: No  Has allergy testing been done: No  Precipitating factors:   None  Alleviating factors:  None  Therapies tried and outcome: None      Review of Systems   Constitutional, HEENT, cardiovascular, pulmonary, gi and gu systems are negative, except as otherwise noted.      Objective    /84 (BP Location: Right arm, Patient Position: Sitting, Cuff Size: Adult Regular)   Pulse 70   Temp 97.5  F (36.4  C) (Temporal)   Resp 16   Ht 1.769 m (5' 9.65\")   Wt 86.9 kg (191 lb 8 oz)   SpO2 95%   " BMI 27.76 kg/m    Body mass index is 27.76 kg/m .  Physical Exam   GENERAL: healthy, alert and no distress  NECK: no adenopathy, no asymmetry, masses, or scars and thyroid normal to palpation  RESP: lungs clear to auscultation - no rales, rhonchi or wheezes  CV: regular rate and rhythm, normal S1 S2, no S3 or S4, no murmur, click or rub, no peripheral edema and peripheral pulses strong  ABDOMEN: soft, nontender, no hepatosplenomegaly, no masses and bowel sounds normal  MS: no gross musculoskeletal defects noted, no edema

## 2023-11-05 ENCOUNTER — HEALTH MAINTENANCE LETTER (OUTPATIENT)
Age: 68
End: 2023-11-05

## 2024-01-31 ENCOUNTER — ANCILLARY PROCEDURE (OUTPATIENT)
Dept: GENERAL RADIOLOGY | Facility: CLINIC | Age: 69
End: 2024-01-31
Attending: FAMILY MEDICINE
Payer: MEDICARE

## 2024-01-31 ENCOUNTER — OFFICE VISIT (OUTPATIENT)
Dept: FAMILY MEDICINE | Facility: CLINIC | Age: 69
End: 2024-01-31
Payer: MEDICARE

## 2024-01-31 VITALS
RESPIRATION RATE: 16 BRPM | WEIGHT: 196.2 LBS | TEMPERATURE: 96.8 F | DIASTOLIC BLOOD PRESSURE: 72 MMHG | BODY MASS INDEX: 29.06 KG/M2 | SYSTOLIC BLOOD PRESSURE: 110 MMHG | OXYGEN SATURATION: 97 % | HEIGHT: 69 IN | HEART RATE: 82 BPM

## 2024-01-31 DIAGNOSIS — M25.532 PAIN IN BOTH WRISTS: ICD-10-CM

## 2024-01-31 DIAGNOSIS — M25.531 PAIN IN BOTH WRISTS: ICD-10-CM

## 2024-01-31 DIAGNOSIS — M25.531 PAIN IN BOTH WRISTS: Primary | ICD-10-CM

## 2024-01-31 DIAGNOSIS — M25.532 PAIN IN BOTH WRISTS: Primary | ICD-10-CM

## 2024-01-31 PROCEDURE — 73110 X-RAY EXAM OF WRIST: CPT | Mod: TC | Performed by: RADIOLOGY

## 2024-01-31 PROCEDURE — 99214 OFFICE O/P EST MOD 30 MIN: CPT | Performed by: FAMILY MEDICINE

## 2024-01-31 RX ORDER — RESPIRATORY SYNCYTIAL VIRUS VACCINE 120MCG/0.5
0.5 KIT INTRAMUSCULAR ONCE
Qty: 1 EACH | Refills: 0 | Status: CANCELLED | OUTPATIENT
Start: 2024-01-31 | End: 2024-01-31

## 2024-01-31 ASSESSMENT — PAIN SCALES - GENERAL: PAINLEVEL: NO PAIN (0)

## 2024-01-31 NOTE — PROGRESS NOTES
"  Assessment & Plan     Pain in both wrists    - XR Wrist Right G/E 3 Views; Future  - XR Wrist Left G/E 3 Views; Future    X-ray of bilateral wrist ordered per patient request.  Symptoms are most likely due to repetitive trauma.  Recommending to use carpal tunnel braces to limit the range of motion.  He would buy that over-the-counter.  Use diclofenac gel to help with inflammation and pain control.  If in 2 months of letting it heal, symptoms are still not improving, instructed to call me back.  Otherwise at that point he can start using very light hand weights and starts working on strengthening of his wrists.  Patient agrees to the plan        BMI  Estimated body mass index is 28.73 kg/m  as calculated from the following:    Height as of this encounter: 1.76 m (5' 9.29\").    Weight as of this encounter: 89 kg (196 lb 3.2 oz).             Sen Lopez is a 68 year old, presenting for the following health issues:  Musculoskeletal Problem        1/31/2024     8:40 AM   Additional Questions   Roomed by Charisse JACOBSON     History of Present Illness       Reason for visit:  Pain in wrist  Symptom onset:  More than a month  Symptoms include:  Wrist soreness and pain, both  Symptom intensity:  Moderate  Symptom progression:  Staying the same  Had these symptoms before:  No  What makes it worse:  Twisting movements  What makes it better:  Not really    He eats 2-3 servings of fruits and vegetables daily.He consumes 1 sweetened beverage(s) daily.He exercises with enough effort to increase his heart rate 10 to 19 minutes per day.  He exercises with enough effort to increase his heart rate 4 days per week.   He is taking medications regularly.       Patient tells that he worked in a place where he had to do repetitive hand movement bilaterally for 2 months straight.  After that he quit that job.  Since then he has had discomfort in his wrist off-and-on.  He notices it more when he is trying to lift any heavier weights and " "flex his wrist.  He feels that is making him weaker in his upper extremity as well.  No previous treatments done.  Denies any fall or trauma               Objective    /72 (BP Location: Left arm, Patient Position: Sitting, Cuff Size: Adult Large)   Pulse 82   Temp 96.8  F (36  C) (Tympanic)   Resp 16   Ht 1.76 m (5' 9.29\")   Wt 89 kg (196 lb 3.2 oz)   SpO2 97%   BMI 28.73 kg/m    Body mass index is 28.73 kg/m .  Physical Exam   GENERAL: alert and no distress  MS: No localized swelling or tenderness noted on exam of the wrist bilaterally.  Range of motion is normal.  Skin: No skin abnormality noted in the affected areas either              Signed Electronically by: Dwaine Rod MD    "

## 2024-04-09 ENCOUNTER — OFFICE VISIT (OUTPATIENT)
Dept: FAMILY MEDICINE | Facility: CLINIC | Age: 69
End: 2024-04-09
Payer: MEDICARE

## 2024-04-09 VITALS
BODY MASS INDEX: 28.63 KG/M2 | TEMPERATURE: 97.9 F | DIASTOLIC BLOOD PRESSURE: 76 MMHG | OXYGEN SATURATION: 99 % | RESPIRATION RATE: 16 BRPM | HEIGHT: 69 IN | WEIGHT: 193.3 LBS | HEART RATE: 72 BPM | SYSTOLIC BLOOD PRESSURE: 118 MMHG

## 2024-04-09 DIAGNOSIS — C61 PROSTATE CANCER (H): Primary | ICD-10-CM

## 2024-04-09 DIAGNOSIS — Z00.00 MEDICARE ANNUAL WELLNESS VISIT, INITIAL: ICD-10-CM

## 2024-04-09 DIAGNOSIS — Z13.220 LIPID SCREENING: ICD-10-CM

## 2024-04-09 DIAGNOSIS — Z12.5 SCREENING FOR PROSTATE CANCER: ICD-10-CM

## 2024-04-09 LAB
ALBUMIN SERPL BCG-MCNC: 4.5 G/DL (ref 3.5–5.2)
ALP SERPL-CCNC: 65 U/L (ref 40–150)
ALT SERPL W P-5'-P-CCNC: 27 U/L (ref 0–70)
ANION GAP SERPL CALCULATED.3IONS-SCNC: 12 MMOL/L (ref 7–15)
AST SERPL W P-5'-P-CCNC: 34 U/L (ref 0–45)
BILIRUB SERPL-MCNC: 1.5 MG/DL
BUN SERPL-MCNC: 10.4 MG/DL (ref 8–23)
CALCIUM SERPL-MCNC: 9.3 MG/DL (ref 8.8–10.2)
CHLORIDE SERPL-SCNC: 102 MMOL/L (ref 98–107)
CHOLEST SERPL-MCNC: 158 MG/DL
CREAT SERPL-MCNC: 1.17 MG/DL (ref 0.67–1.17)
DEPRECATED HCO3 PLAS-SCNC: 24 MMOL/L (ref 22–29)
EGFRCR SERPLBLD CKD-EPI 2021: 69 ML/MIN/1.73M2
FASTING STATUS PATIENT QL REPORTED: YES
GLUCOSE SERPL-MCNC: 108 MG/DL (ref 70–99)
HDLC SERPL-MCNC: 51 MG/DL
HGB BLD-MCNC: 15.9 G/DL (ref 13.3–17.7)
LDLC SERPL CALC-MCNC: 83 MG/DL
NONHDLC SERPL-MCNC: 107 MG/DL
POTASSIUM SERPL-SCNC: 4.1 MMOL/L (ref 3.4–5.3)
PROT SERPL-MCNC: 7.2 G/DL (ref 6.4–8.3)
PSA SERPL DL<=0.01 NG/ML-MCNC: <0.01 NG/ML (ref 0–4.5)
SODIUM SERPL-SCNC: 138 MMOL/L (ref 135–145)
TRIGL SERPL-MCNC: 118 MG/DL

## 2024-04-09 PROCEDURE — 85018 HEMOGLOBIN: CPT | Performed by: FAMILY MEDICINE

## 2024-04-09 PROCEDURE — 80061 LIPID PANEL: CPT | Performed by: FAMILY MEDICINE

## 2024-04-09 PROCEDURE — 36415 COLL VENOUS BLD VENIPUNCTURE: CPT | Performed by: FAMILY MEDICINE

## 2024-04-09 PROCEDURE — G0103 PSA SCREENING: HCPCS | Performed by: FAMILY MEDICINE

## 2024-04-09 PROCEDURE — G0402 INITIAL PREVENTIVE EXAM: HCPCS | Mod: 4MD | Performed by: FAMILY MEDICINE

## 2024-04-09 PROCEDURE — 80053 COMPREHEN METABOLIC PANEL: CPT | Performed by: FAMILY MEDICINE

## 2024-04-09 RX ORDER — RESPIRATORY SYNCYTIAL VIRUS VACCINE 120MCG/0.5
0.5 KIT INTRAMUSCULAR ONCE
Qty: 1 EACH | Refills: 0 | Status: CANCELLED | OUTPATIENT
Start: 2024-04-09 | End: 2024-04-09

## 2024-04-09 SDOH — HEALTH STABILITY: PHYSICAL HEALTH: ON AVERAGE, HOW MANY DAYS PER WEEK DO YOU ENGAGE IN MODERATE TO STRENUOUS EXERCISE (LIKE A BRISK WALK)?: 3 DAYS

## 2024-04-09 ASSESSMENT — PAIN SCALES - GENERAL: PAINLEVEL: MODERATE PAIN (4)

## 2024-04-09 ASSESSMENT — SOCIAL DETERMINANTS OF HEALTH (SDOH): HOW OFTEN DO YOU GET TOGETHER WITH FRIENDS OR RELATIVES?: ONCE A WEEK

## 2024-04-09 NOTE — PROGRESS NOTES
"Preventive Care Visit  [facility] ABDOUL Padilla MD, Family Medicine  Apr 9, 2024      Assessment & Plan     Prostate cancer (H)      Lipid screening    - Lipid panel reflex to direct LDL Fasting; Future  - Comprehensive metabolic panel; Future  - Lipid panel reflex to direct LDL Fasting  - Comprehensive metabolic panel    Medicare annual wellness visit, initial    - Lipid panel reflex to direct LDL Fasting; Future  - Comprehensive metabolic panel; Future  - Hemoglobin; Future  - Lipid panel reflex to direct LDL Fasting  - Comprehensive metabolic panel  - Hemoglobin    Screening for prostate cancer    - Prostate Specific Antigen Screen; Future  - Prostate Specific Antigen Screen          BMI  Estimated body mass index is 28.47 kg/m  as calculated from the following:    Height as of this encounter: 1.755 m (5' 9.09\").    Weight as of this encounter: 87.7 kg (193 lb 4.8 oz).       Counseling  Appropriate preventive services were discussed with this patient, including applicable screening as appropriate for fall prevention, nutrition, physical activity, Tobacco-use cessation, weight loss and cognition.  Checklist reviewing preventive services available has been given to the patient.  Reviewed patient's diet, addressing concerns and/or questions.   He is at risk for lack of exercise and has been provided with information to increase physical activity for the benefit of his well-being.   He is at risk for psychosocial distress and has been provided with information to reduce risk.           Sen Lopez is a 68 year old, presenting for the following:  Physical (Fasting )        4/9/2024     7:09 AM   Additional Questions   Roomed by Kenny   Accompanied by N/A        Health Care Directive  Patient does not have a Health Care Directive or Living Will:     HPI   No concerns, tries to take care of his health and nutrition. History of Prostrate cancer.      4/9/2024   General Health   How " would you rate your overall physical health? Good   Feel stress (tense, anxious, or unable to sleep) Only a little   (!) STRESS CONCERN      4/9/2024   Nutrition   Diet: Regular (no restrictions)         4/9/2024   Exercise   Days per week of moderate/strenous exercise 3 days         4/9/2024   Social Factors   Frequency of gathering with friends or relatives Once a week   Worry food won't last until get money to buy more No   Food not last or not have enough money for food? No   Do you have housing?  Yes   Are you worried about losing your housing? No   Lack of transportation? No   Unable to get utilities (heat,electricity)? No         4/9/2024   Activities of Daily Living- Home Safety   Needs help with the following daily activites None of the above   Safety concerns in the home None of the above         4/9/2024   Dental   Dentist two times every year? Yes         4/9/2024   Hearing Screening   Hearing concerns? None of the above         4/9/2024   Driving Risk Screening   Patient/family members have concerns about driving No         4/9/2024   General Alertness/Fatigue Screening   Have you been more tired than usual lately? No         4/9/2024   Urinary Incontinence Screening   Bothered by leaking urine in past 6 months No         4/9/2024   TB Screening   Were you born outside of the [place]? No         Today's PHQ-2 Score:       4/9/2024     7:01 AM   PHQ-2 ( 1999 [employer])   Q1: Little interest or pleasure in doing things 0   Q2: Feeling down, depressed or hopeless 0   PHQ-2 Score 0   Q1: Little interest or pleasure in doing things Not at all   Q2: Feeling down, depressed or hopeless Not at all   PHQ-2 Score 0           4/9/2024   Substance Use   Alcohol more than 3/day or more than 7/wk No   Do you have a current opioid prescription? No   How severe/bad is pain from 1 to 10? 4/10   Do you use any other substances recreationally? No     Social History     Tobacco Use    Smoking status: Former     Types: Cigarettes      Quit date: 2003     Years since quittin.2    Smokeless tobacco: Never   Vaping Use    Vaping Use: Never used   Substance Use Topics    Alcohol use: Yes     Comment: rare     Drug use: No       Last PSA:   PSA   Date Value Ref Range Status   12/15/2020 21.20 (H) 0 - 4 ug/L Final     Comment:     Assay Method:  Chemiluminescence using Siemens Vista analyzer     Prostate Specific Antigen Screen   Date Value Ref Range Status   2023 <0.01 0.00 - 4.50 ng/mL Final   2022 <0.01 0.00 - 4.00 ug/L Final     ASCVD Risk   The 10-year ASCVD risk score (Ricki PATEL, et al., 2019) is: 6.9%    Values used to calculate the score:      Age: 68 years      Sex: Male      Is Non- : Yes      Diabetic: No      Tobacco smoker: No      Systolic Blood Pressure: 110 mmHg      Is BP treated: No      HDL Cholesterol: 59 mg/dL      Total Cholesterol: 149 mg/dL          Reviewed and updated as needed this visit by Provider                      Current providers sharing in care for this patient include:  Patient Care Team:  Duncan Jenkins MD as PCP - General (Family Practice)  Duncan Jenkins MD as Assigned PCP  Sukumar Mckeon MD as MD (Urology)    The following health maintenance items are reviewed in Epic and correct as of today:  Health Maintenance   Topic Date Due    ZOSTER IMMUNIZATION (1 of 2) Never done    RSV VACCINE (Pregnancy & 60+) (1 - 1-dose 60+ series) Never done    IPV IMMUNIZATION (2 of 3 - Adult catch-up series) 2022    MEDICARE ANNUAL WELLNESS VISIT  2023    ANNUAL REVIEW OF HM ORDERS  2023    FALL RISK ASSESSMENT  2025    GLUCOSE  2025    LIPID  2027    ADVANCE CARE PLANNING  2027    COLORECTAL CANCER SCREENING  2029    DTAP/TDAP/TD IMMUNIZATION (4 - Td or Tdap) 2032    PHQ-2 (once per calendar year)  Completed    INFLUENZA VACCINE  Completed    Pneumococcal Vaccine: 65+ Years  Completed    AORTIC ANEURYSM  "SCREENING (SYSTEM ASSIGNED)  Completed    COVID-19 Vaccine  Completed    HPV IMMUNIZATION  Aged Out    MENINGITIS IMMUNIZATION  Aged Out    RSV MONOCLONAL ANTIBODY  Aged Out    HEPATITIS C SCREENING  Discontinued         Review of Systems  CONSTITUTIONAL: NEGATIVE for fever, chills, change in weight  INTEGUMENTARY/SKIN: NEGATIVE for worrisome rashes, moles or lesions  EYES: NEGATIVE for vision changes or irritation  ENT/MOUTH: NEGATIVE for ear, mouth and throat problems  RESP: NEGATIVE for significant cough or SOB  BREAST: NEGATIVE for masses, tenderness or discharge  CV: NEGATIVE for chest pain, palpitations or peripheral edema  GI: NEGATIVE for nausea, abdominal pain, heartburn, or change in bowel habits  : NEGATIVE for frequency, dysuria, or hematuria  MUSCULOSKELETAL: NEGATIVE for significant arthralgias or myalgia  NEURO: NEGATIVE for weakness, dizziness or paresthesias  ENDOCRINE: NEGATIVE for temperature intolerance, skin/hair changes  HEME: NEGATIVE for bleeding problems  PSYCHIATRIC: NEGATIVE for changes in mood or affect     Objective    Exam  There were no vitals taken for this visit.   Estimated body mass index is 28.73 kg/m  as calculated from the following:    Height as of 1/31/24: 1.76 m (5' 9.29\").    Weight as of 1/31/24: 89 kg (196 lb 3.2 oz).    Physical Exam  GENERAL: alert and no distress  EYES: Eyes grossly normal to inspection, PERRL and conjunctivae and sclerae normal  HENT: ear canals and TM's normal, nose and mouth without ulcers or lesions  NECK: no adenopathy, no asymmetry, masses, or scars  RESP: lungs clear to auscultation - no rales, rhonchi or wheezes  CV: regular rate and rhythm, normal S1 S2, no S3 or S4, no murmur, click or rub, no peripheral edema  ABDOMEN: soft, nontender, no hepatosplenomegaly, no masses and bowel sounds normal  MS: no gross musculoskeletal defects noted, no edema  SKIN: no suspicious lesions or rashes  NEURO: Normal strength and tone, mentation intact and " speech normal  PSYCH: mentation appears normal, affect normal/bright        4/9/2024   Mini Cog   Clock Draw Score 2 Normal   3 Item Recall 3 objects recalled   Mini Cog Total Score 5         Vision Screen         Signed Electronically by: Gume Padilla MD

## 2024-04-09 NOTE — LETTER
April 15, 2024      John Oliveira  [address]   [place] [address]        Dear ,    I have reviewed your recent labs. Here are the results:     -Hemoglobin is normal.  There is no evidence of anemia.   -PSA (prostate specific antigen) test is normal.  This indicates a low likelihood of prostate cancer.  ADVISE: rechecking this in 1 year.   -Cholesterol levels (LDL,HDL, Triglycerides) are normal.  ADVISE: rechecking in 1 year.   -Liver kidney functions are normal.  Blood glucose is mildly elevated but not in diabetic range.     Resulted Orders   Lipid panel reflex to direct LDL Fasting   Result Value Ref Range    Cholesterol 158 <200 mg/dL    Triglycerides 118 <150 mg/dL    Direct Measure HDL 51 >=40 mg/dL    LDL Cholesterol Calculated 83 <=100 mg/dL    Non HDL Cholesterol 107 <130 mg/dL    Patient Fasting > 8hrs? Yes     Narrative    Cholesterol  Desirable:  <200 mg/dL    Triglycerides  Normal:  Less than 150 mg/dL  Borderline High:  150-199 mg/dL  High:  200-499 mg/dL  Very High:  Greater than or equal to 500 mg/dL    Direct Measure HDL  Female:  Greater than or equal to 50 mg/dL   Male:  Greater than or equal to 40 mg/dL    LDL Cholesterol  Desirable:  <100mg/dL  Above Desirable:  100-129 mg/dL   Borderline High:  130-159 mg/dL   High:  160-189 mg/dL   Very High:  >= 190 mg/dL    Non HDL Cholesterol  Desirable:  130 mg/dL  Above Desirable:  130-159 mg/dL  Borderline High:  160-189 mg/dL  High:  190-219 mg/dL  Very High:  Greater than or equal to 220 mg/dL   Comprehensive metabolic panel   Result Value Ref Range    Sodium 138 135 - 145 mmol/L      Comment:      Reference intervals for this test were updated on 09/26/2023 to more accurately reflect our healthy population. There may be differences in the flagging of prior results with similar values performed with this method. Interpretation of those prior results can be made in the context of the updated reference intervals.      Potassium 4.1 3.4 - 5.3 mmol/L    Carbon Dioxide (CO2) 24 22 - 29 mmol/L    Anion Gap 12 7 - 15 mmol/L    Urea Nitrogen 10.4 8.0 - 23.0 mg/dL    Creatinine 1.17 0.67 - 1.17 mg/dL    GFR Estimate 69 >60 mL/min/1.73m2    Calcium 9.3 8.8 - 10.2 mg/dL    Chloride 102 98 - 107 mmol/L    Glucose 108 (H) 70 - 99 mg/dL    Alkaline Phosphatase 65 40 - 150 U/L      Comment:      Reference intervals for this test were updated on 11/14/2023 to more accurately reflect our healthy population. There may be differences in the flagging of prior results with similar values performed with this method. Interpretation of those prior results can be made in the context of the updated reference intervals.    AST 34 0 - 45 U/L      Comment:      Reference intervals for this test were updated on 6/12/2023 to more accurately reflect our healthy population. There may be differences in the flagging of prior results with similar values performed with this method. Interpretation of those prior results can be made in the context of the updated reference intervals.    ALT 27 0 - 70 U/L      Comment:      Reference intervals for this test were updated on 6/12/2023 to more accurately reflect our healthy population. There may be differences in the flagging of prior results with similar values performed with this method. Interpretation of those prior results can be made in the context of the updated reference intervals.      Protein Total 7.2 6.4 - 8.3 g/dL    Albumin 4.5 3.5 - 5.2 g/dL    Bilirubin Total 1.5 (H) <=1.2 mg/dL   Prostate Specific Antigen Screen   Result Value Ref Range    Prostate Specific Antigen Screen <0.01 0.00 - 4.50 ng/mL    Narrative    This result is obtained using the Roche Elecsys total PSA method on the [place] [id number] immunoassay analyzer. Results obtained with different assay methods or kits cannot be used interchangeably.   Hemoglobin   Result Value Ref Range    Hemoglobin 15.9 13.3 - 17.7 g/dL       If you have any questions or  concerns, please call the clinic at the number listed above.       Sincerely,      Gume Padilla MD

## 2024-12-16 ENCOUNTER — OFFICE VISIT (OUTPATIENT)
Dept: FAMILY MEDICINE | Facility: CLINIC | Age: 69
End: 2024-12-16
Payer: MEDICARE

## 2024-12-16 VITALS
BODY MASS INDEX: 29.56 KG/M2 | HEART RATE: 85 BPM | HEIGHT: 70 IN | SYSTOLIC BLOOD PRESSURE: 120 MMHG | OXYGEN SATURATION: 98 % | RESPIRATION RATE: 18 BRPM | TEMPERATURE: 97.3 F | WEIGHT: 206.5 LBS | DIASTOLIC BLOOD PRESSURE: 82 MMHG

## 2024-12-16 DIAGNOSIS — K30 FUNCTIONAL DYSPEPSIA: ICD-10-CM

## 2024-12-16 DIAGNOSIS — S93.492A SPRAIN OF OTHER LIGAMENT OF LEFT ANKLE, INITIAL ENCOUNTER: ICD-10-CM

## 2024-12-16 DIAGNOSIS — C61 PROSTATE CANCER (H): ICD-10-CM

## 2024-12-16 DIAGNOSIS — M19.039 WRIST ARTHRITIS: Primary | ICD-10-CM

## 2024-12-16 PROCEDURE — G2211 COMPLEX E/M VISIT ADD ON: HCPCS | Performed by: FAMILY MEDICINE

## 2024-12-16 PROCEDURE — 99214 OFFICE O/P EST MOD 30 MIN: CPT | Performed by: FAMILY MEDICINE

## 2024-12-16 RX ORDER — PANTOPRAZOLE SODIUM 20 MG/1
20 TABLET, DELAYED RELEASE ORAL 2 TIMES DAILY
Qty: 30 TABLET | Refills: 0 | Status: SHIPPED | OUTPATIENT
Start: 2024-12-16

## 2024-12-16 RX ORDER — CELECOXIB 100 MG/1
100 CAPSULE ORAL 2 TIMES DAILY
Qty: 60 CAPSULE | Refills: 0 | Status: SHIPPED | OUTPATIENT
Start: 2024-12-16

## 2024-12-16 RX ORDER — MULTIVITAMIN
1 TABLET ORAL DAILY
COMMUNITY

## 2024-12-16 ASSESSMENT — PAIN SCALES - GENERAL: PAINLEVEL_OUTOF10: NO PAIN (0)

## 2024-12-16 ASSESSMENT — ENCOUNTER SYMPTOMS: ABDOMINAL PAIN: 1

## 2024-12-16 NOTE — PROGRESS NOTES
"  Assessment & Plan     Wrist arthritis  Has been worsening with repetitive use on bilateral wirst at work, will have him to try OT and start WAITE-2I  - Occupational Therapy  Referral; Future  - celecoxib (CELEBREX) 100 MG capsule; Take 1 capsule (100 mg) by mouth 2 times daily.    Sprain of other ligament of left ankle, initial encounter  Had past h/o calcaneal fracture 30 years ago, he unfortunately re-injured 3 weeks ago while roller skating, will have him to do conservative treatment with stirrup cast and WAITE-2I  - Ankle/Foot Bracing Supplies Order Air Ankle Stirrup Brace; Left    Functional dyspepsia  Has been having discomfort with pressure and bloating, feeling indigestion   Will have him to try PPI  - pantoprazole (PROTONIX) 20 MG EC tablet; Take 1 tablet (20 mg) by mouth 2 times daily.    Prostate cancer (H)  Seeing urology       BMI  Estimated body mass index is 29.23 kg/m  as calculated from the following:    Height as of this encounter: 1.79 m (5' 10.47\").    Weight as of this encounter: 93.7 kg (206 lb 8 oz).   Weight management plan: Discussed healthy diet and exercise guidelines      FUTURE APPOINTMENTS:       - Follow-up visit in 5 months for CPE    Subjective   John is a 69 year old, presenting for the following health issues:  Wrist Pain (Bilateral wrist pain related to work since last year. Has been using brace, no relief. ), Foot Pain (Related to old injury in roller skating, left foot, no treatment tried. ), and Abdominal Pain (Left upper quadrant, 4 month, no treatment tried. )        12/16/2024    11:24 AM   Additional Questions   Roomed by Simi LUIS     Abdominal Pain   This is a new problem.   History of Present Illness       Reason for visit:  Wrist and foot and wellness check/scheduled    He eats 2-3 servings of fruits and vegetables daily.He consumes 1 sweetened beverage(s) daily.He exercises with enough effort to increase his heart rate 9 or less minutes per day.  He exercises " "with enough effort to increase his heart rate 3 or less days per week.   He is taking medications regularly.         Stomach Pain  Onset/Duration: 4 months  Description: Inflating and deflating pain in left upper quadrant  Intensity: mild  Progression of Symptoms: same  Accompanying Signs & Symptoms:  Does it feel like food gets stuck or trouble swallowing: No  Nausea: No  Vomiting (bloody?): No  Abdominal Discomfort: YES  Black-Tarry stools: No  Bloody stools: No  History:  Previous similar episodes: No  Previous ulcers: No  Precipitating factors:   Caffeine use: No  Alcohol use: No  NSAID/Aspirin use: No  Tobacco use: No  Worse with no particular food or drink.  Alleviating factors: None  Therapies tried and outcome:             Lifestyle changes: None            Medications: none  Pain History:  When did you first notice your pain? A year ago   Have you seen this provider for your pain in the past? Yes   Where in your body do you have pain? Bilateral wrist pain  Are you seeing anyone else for your pain? No                PDMP Review       None          Last CSA Agreement:   CSA -- Patient Level:    CSA: None found at the patient level.                   Review of Systems  Constitutional, HEENT, cardiovascular, pulmonary, GI, , musculoskeletal, neuro, skin, endocrine and psych systems are negative, except as otherwise noted.      Objective    /82   Pulse 85   Temp 97.3  F (36.3  C) (Temporal)   Resp 18   Ht 1.79 m (5' 10.47\")   Wt 93.7 kg (206 lb 8 oz)   SpO2 98%   BMI 29.23 kg/m    Body mass index is 29.23 kg/m .  Physical Exam   GENERAL: alert and no distress  EYES: Eyes grossly normal to inspection, PERRL and conjunctivae and sclerae normal  HENT: ear canals and TM's normal, nose and mouth without ulcers or lesions  NECK: no adenopathy, no asymmetry, masses, or scars  RESP: lungs clear to auscultation - no rales, rhonchi or wheezes  CV: regular rate and rhythm, normal S1 S2, no S3 or S4, no " murmur, click or rub, no peripheral edema  ABDOMEN: soft, nontender, no hepatosplenomegaly, no masses and bowel sounds normal  MS: no gross musculoskeletal defects noted, no edema  SKIN: no suspicious lesions or rashes  NEURO: Normal strength and tone, mentation intact and speech normal  BACK: no CVA tenderness, no paralumbar tenderness  PSYCH: mentation appears normal, affect normal/bright    The longitudinal plan of care for the diagnosis(es)/condition(s) as documented were addressed during this visit. Due to the added complexity in care, I will continue to support John in the subsequent management and with ongoing continuity of care.    Signed Electronically by: Duncan Jenkins MD

## 2025-01-17 PROBLEM — M25.531 PAIN IN BOTH WRISTS: Status: ACTIVE | Noted: 2025-01-17

## 2025-01-17 PROBLEM — M25.532 PAIN IN BOTH WRISTS: Status: ACTIVE | Noted: 2025-01-17

## 2025-03-10 ENCOUNTER — PATIENT OUTREACH (OUTPATIENT)
Dept: CARE COORDINATION | Facility: CLINIC | Age: 70
End: 2025-03-10
Payer: COMMERCIAL

## 2025-03-24 ENCOUNTER — PATIENT OUTREACH (OUTPATIENT)
Dept: CARE COORDINATION | Facility: CLINIC | Age: 70
End: 2025-03-24
Payer: COMMERCIAL

## 2025-05-12 SDOH — HEALTH STABILITY: PHYSICAL HEALTH: ON AVERAGE, HOW MANY MINUTES DO YOU ENGAGE IN EXERCISE AT THIS LEVEL?: 20 MIN

## 2025-05-12 SDOH — HEALTH STABILITY: PHYSICAL HEALTH: ON AVERAGE, HOW MANY DAYS PER WEEK DO YOU ENGAGE IN MODERATE TO STRENUOUS EXERCISE (LIKE A BRISK WALK)?: 1 DAY

## 2025-05-12 ASSESSMENT — SOCIAL DETERMINANTS OF HEALTH (SDOH): HOW OFTEN DO YOU GET TOGETHER WITH FRIENDS OR RELATIVES?: ONCE A WEEK

## 2025-05-13 ENCOUNTER — OFFICE VISIT (OUTPATIENT)
Dept: FAMILY MEDICINE | Facility: CLINIC | Age: 70
End: 2025-05-13
Payer: COMMERCIAL

## 2025-05-13 VITALS
OXYGEN SATURATION: 97 % | HEIGHT: 69 IN | DIASTOLIC BLOOD PRESSURE: 80 MMHG | WEIGHT: 193 LBS | BODY MASS INDEX: 28.58 KG/M2 | TEMPERATURE: 97 F | RESPIRATION RATE: 16 BRPM | SYSTOLIC BLOOD PRESSURE: 114 MMHG | HEART RATE: 84 BPM

## 2025-05-13 DIAGNOSIS — Z00.00 MEDICARE ANNUAL WELLNESS VISIT, INITIAL: ICD-10-CM

## 2025-05-13 DIAGNOSIS — Z13.220 LIPID SCREENING: Primary | ICD-10-CM

## 2025-05-13 DIAGNOSIS — C61 PROSTATE CANCER (H): ICD-10-CM

## 2025-05-13 DIAGNOSIS — Z12.5 SCREENING FOR PROSTATE CANCER: ICD-10-CM

## 2025-05-13 LAB — HGB BLD-MCNC: 16.1 G/DL (ref 13.3–17.7)

## 2025-05-13 PROCEDURE — 36415 COLL VENOUS BLD VENIPUNCTURE: CPT | Performed by: FAMILY MEDICINE

## 2025-05-13 PROCEDURE — G0438 PPPS, INITIAL VISIT: HCPCS | Performed by: FAMILY MEDICINE

## 2025-05-13 PROCEDURE — 80053 COMPREHEN METABOLIC PANEL: CPT | Performed by: FAMILY MEDICINE

## 2025-05-13 PROCEDURE — 3074F SYST BP LT 130 MM HG: CPT | Performed by: FAMILY MEDICINE

## 2025-05-13 PROCEDURE — 85018 HEMOGLOBIN: CPT | Performed by: FAMILY MEDICINE

## 2025-05-13 PROCEDURE — 3079F DIAST BP 80-89 MM HG: CPT | Performed by: FAMILY MEDICINE

## 2025-05-13 PROCEDURE — G0103 PSA SCREENING: HCPCS | Performed by: FAMILY MEDICINE

## 2025-05-13 PROCEDURE — 1126F AMNT PAIN NOTED NONE PRSNT: CPT | Performed by: FAMILY MEDICINE

## 2025-05-13 PROCEDURE — 80061 LIPID PANEL: CPT | Performed by: FAMILY MEDICINE

## 2025-05-13 ASSESSMENT — PAIN SCALES - GENERAL: PAINLEVEL_OUTOF10: NO PAIN (0)

## 2025-05-13 NOTE — PROGRESS NOTES
Preventive Care Visit  [facility] ABDOUL Padilla MD, Family Medicine  May 13, 2025      Assessment & Plan     Medicare annual wellness visit, initial    - Lipid panel reflex to direct LDL Fasting; Future  - Comprehensive metabolic panel; Future  - Hemoglobin; Future  - Lipid panel reflex to direct LDL Fasting  - Comprehensive metabolic panel  - Hemoglobin    Lipid screening    - Lipid panel reflex to direct LDL Fasting; Future  - Comprehensive metabolic panel; Future  - Lipid panel reflex to direct LDL Fasting  - Comprehensive metabolic panel    Prostate cancer (H)  S/p surgery    Screening for prostate cancer  - PSA, screen; Future  - PSA, screen            Counseling  Appropriate preventive services were addressed with this patient via screening, questionnaire, or discussion as appropriate for fall prevention, nutrition, physical activity, Tobacco-use cessation, social engagement, weight loss and cognition.  Checklist reviewing preventive services available has been given to the patient.  Reviewed patient's diet, addressing concerns and/or questions.   He is at risk for lack of exercise and has been provided with information to increase physical activity for the benefit of his well-being.           Sen Lopez is a 69 year old, presenting for the following:  Annual Visit (Fasting )  No current issues, history of prostrate cancer s/p surgery.      5/13/2025    10:24 AM   Additional Questions   Roomed by Miquel LOOMIS    Advance Care Planning    Discussed advance care planning with patient; informed AVS has link to [employer] [employer].        5/12/2025   General Health   How would you rate your overall physical health? Good   Feel stress (tense, anxious, or unable to sleep) Only a little   (!) STRESS CONCERN      5/12/2025   Nutrition   Diet: Regular (no restrictions)         5/12/2025   Exercise   Days per week of moderate/strenous exercise 1 day   Average minutes spent exercising  at this level 20 min   (!) EXERCISE CONCERN      5/12/2025   Social Factors   Frequency of gathering with friends or relatives Once a week   Worry food won't last until get money to buy more No   Food not last or not have enough money for food? No   Do you have housing? (Housing is defined as stable permanent housing and does not include staying outside in a car, in a tent, in an abandoned building, in an overnight shelter, or couch-surfing.) Yes   Are you worried about losing your housing? No   Lack of transportation? No   Unable to get utilities (heat,electricity)? No         5/12/2025   Fall Risk   Fallen 2 or more times in the past year? No   Trouble with walking or balance? No          5/12/2025   Activities of Daily Living- Home Safety   Needs help with the following daily activites None of the above   Safety concerns in the home None of the above         5/12/2025   Dental   Dentist two times every year? Yes         5/12/2025   Hearing Screening   Hearing concerns? None of the above         5/12/2025   Driving Risk Screening   Patient/family members have concerns about driving No         5/12/2025   General Alertness/Fatigue Screening   Have you been more tired than usual lately? No         5/12/2025   Urinary Incontinence Screening   Bothered by leaking urine in past 6 months No         Today's PHQ-2 Score:       5/12/2025    11:03 AM   PHQ-2 ( 1999 [employer])   Q1: Little interest or pleasure in doing things 0   Q2: Feeling down, depressed or hopeless 0   PHQ-2 Score 0    Q1: Little interest or pleasure in doing things Not at all   Q2: Feeling down, depressed or hopeless Not at all   PHQ-2 Score 0       Patient-reported           5/12/2025   Substance Use   Alcohol more than 3/day or more than 7/wk No   Do you have a current opioid prescription? No   How severe/bad is pain from 1 to 10? 0/10 (No Pain)   Do you use any other substances recreationally? No     Social History     Tobacco Use    Smoking status:  Former     Current packs/day: 0.00     Average packs/day: 1 pack/day for 25.0 years (25.0 ttl pk-yrs)     Types: Cigarettes     Start date: 1979     Quit date: 2008     Years since quittin.9    Smokeless tobacco: Never   Vaping Use    Vaping status: Never Used   Substance Use Topics    Alcohol use: Yes     Comment: 2-3 drinks per month    Drug use: No       Last PSA:   PSA   Date Value Ref Range Status   12/15/2020 21.20 (H) 0 - 4 ug/L Final     Comment:     Assay Method:  Chemiluminescence using Siemens Vista analyzer     Prostate Specific Antigen Screen   Date Value Ref Range Status   2024 <0.01 0.00 - 4.50 ng/mL Final   2022 <0.01 0.00 - 4.00 ug/L Final     ASCVD Risk   The 10-year ASCVD risk score (Ricki PATEL, et al., 2019) is: 8.1%    Values used to calculate the score:      Age: 69 years      Sex: Male      Is Non- : Yes      Diabetic: No      Tobacco smoker: No      Systolic Blood Pressure: 114 mmHg      Is BP treated: No      HDL Cholesterol: 51 mg/dL      Total Cholesterol: 158 mg/dL    Reviewed and updated as needed this visit by Provider                    Past Medical History:   Diagnosis Date    Cancer (H)     Prostatectomy     Past Surgical History:   Procedure Laterality Date    COLONOSCOPY N/A 2019    Procedure: COLONOSCOPY;  Surgeon: Warren Mojica MD;  Location:  GI    ORTHOPEDIC SURGERY Left     foot pins in place      Current providers sharing in care for this patient include:  Patient Care Team:  Duncan Jenkins MD as PCP - General (Family Practice)  Sukumar Mckeon MD as MD (Urology)  Gume Padilla MD as Assigned PCP    The following health maintenance items are reviewed in Epic and correct as of today:  Health Maintenance   Topic Date Due    ZOSTER IMMUNIZATION (1 of 2) Never done    MEDICARE ANNUAL WELLNESS VISIT  2025    ANNUAL REVIEW OF HM ORDERS  2025    COVID-19 Vaccine ( season)  "06/28/2025    FALL RISK ASSESSMENT  05/13/2026    DIABETES SCREENING  04/09/2027    ADVANCE CARE PLANNING  08/29/2027    COLORECTAL CANCER SCREENING  02/21/2029    LIPID  04/09/2029    RSV VACCINE (1 - 1-dose 75+ series) 06/18/2032    DTAP/TDAP/TD IMMUNIZATION (4 - Td or Tdap) 08/29/2032    PHQ-2 (once per calendar year)  Completed    INFLUENZA VACCINE  Completed    Pneumococcal Vaccine: 50+ Years  Completed    AORTIC ANEURYSM SCREENING (SYSTEM ASSIGNED)  Completed    HPV IMMUNIZATION  Aged Out    MENINGITIS IMMUNIZATION  Aged Out    HEPATITIS C SCREENING  Discontinued         Review of Systems  CONSTITUTIONAL: NEGATIVE for fever, chills, change in weight  INTEGUMENTARY/SKIN: NEGATIVE for worrisome rashes, moles or lesions  EYES: NEGATIVE for vision changes or irritation  ENT/MOUTH: NEGATIVE for ear, mouth and throat problems  RESP: NEGATIVE for significant cough or SOB  BREAST: NEGATIVE for masses, tenderness or discharge  CV: NEGATIVE for chest pain, palpitations or peripheral edema  GI: NEGATIVE for nausea, abdominal pain, heartburn, or change in bowel habits  : NEGATIVE for frequency, dysuria, or hematuria  MUSCULOSKELETAL: NEGATIVE for significant arthralgias or myalgia  NEURO: NEGATIVE for weakness, dizziness or paresthesias  ENDOCRINE: NEGATIVE for temperature intolerance, skin/hair changes  HEME: NEGATIVE for bleeding problems  PSYCHIATRIC: NEGATIVE for changes in mood or affect     Objective    Exam  /80   Pulse 84   Temp 97  F (36.1  C) (Tympanic)   Resp 16   Ht 1.753 m (5' 9\")   Wt 87.5 kg (193 lb)   SpO2 97%   BMI 28.50 kg/m     Estimated body mass index is 28.5 kg/m  as calculated from the following:    Height as of this encounter: 1.753 m (5' 9\").    Weight as of this encounter: 87.5 kg (193 lb).    Physical Exam  GENERAL: alert and no distress  EYES: Eyes grossly normal to inspection, PERRL and conjunctivae and sclerae normal  HENT: ear canals and TM's normal, nose and mouth without " ulcers or lesions  NECK: no adenopathy, no asymmetry, masses, or scars  RESP: lungs clear to auscultation - no rales, rhonchi or wheezes  CV: regular rate and rhythm, normal S1 S2, no S3 or S4, no murmur, click or rub, no peripheral edema  ABDOMEN: soft, nontender, no hepatosplenomegaly, no masses and bowel sounds normal  MS: no gross musculoskeletal defects noted, no edema  SKIN: no suspicious lesions or rashes  NEURO: Normal strength and tone, mentation intact and speech normal  PSYCH: mentation appears normal, affect normal/bright        5/13/2025   Mini Cog   Clock Draw Score 2 Normal   3 Item Recall 3 objects recalled   Mini Cog Total Score 5              Signed Electronically by: Gume Padilla MD

## 2025-05-14 ENCOUNTER — RESULTS FOLLOW-UP (OUTPATIENT)
Dept: FAMILY MEDICINE | Facility: CLINIC | Age: 70
End: 2025-05-14

## 2025-05-14 LAB
ALBUMIN SERPL BCG-MCNC: 4.3 G/DL (ref 3.5–5.2)
ALP SERPL-CCNC: 94 U/L (ref 40–150)
ALT SERPL W P-5'-P-CCNC: 30 U/L (ref 0–70)
ANION GAP SERPL CALCULATED.3IONS-SCNC: 12 MMOL/L (ref 7–15)
AST SERPL W P-5'-P-CCNC: 30 U/L (ref 0–45)
BILIRUB SERPL-MCNC: 1 MG/DL
BUN SERPL-MCNC: 10.3 MG/DL (ref 8–23)
CALCIUM SERPL-MCNC: 9.4 MG/DL (ref 8.8–10.4)
CHLORIDE SERPL-SCNC: 101 MMOL/L (ref 98–107)
CHOLEST SERPL-MCNC: 180 MG/DL
CREAT SERPL-MCNC: 1.2 MG/DL (ref 0.67–1.17)
EGFRCR SERPLBLD CKD-EPI 2021: 66 ML/MIN/1.73M2
FASTING STATUS PATIENT QL REPORTED: ABNORMAL
FASTING STATUS PATIENT QL REPORTED: ABNORMAL
GLUCOSE SERPL-MCNC: 109 MG/DL (ref 70–99)
HCO3 SERPL-SCNC: 24 MMOL/L (ref 22–29)
HDLC SERPL-MCNC: 53 MG/DL
LDLC SERPL CALC-MCNC: 106 MG/DL
NONHDLC SERPL-MCNC: 127 MG/DL
POTASSIUM SERPL-SCNC: 4.1 MMOL/L (ref 3.4–5.3)
PROT SERPL-MCNC: 7.4 G/DL (ref 6.4–8.3)
PSA SERPL DL<=0.01 NG/ML-MCNC: <0.01 NG/ML (ref 0–4.5)
SODIUM SERPL-SCNC: 137 MMOL/L (ref 135–145)
TRIGL SERPL-MCNC: 107 MG/DL

## (undated) RX ORDER — FENTANYL CITRATE 50 UG/ML
INJECTION, SOLUTION INTRAMUSCULAR; INTRAVENOUS
Status: DISPENSED
Start: 2019-02-21